# Patient Record
Sex: MALE | Race: WHITE | NOT HISPANIC OR LATINO | Employment: FULL TIME | URBAN - NONMETROPOLITAN AREA
[De-identification: names, ages, dates, MRNs, and addresses within clinical notes are randomized per-mention and may not be internally consistent; named-entity substitution may affect disease eponyms.]

---

## 2019-12-05 ENCOUNTER — OFFICE VISIT (OUTPATIENT)
Dept: MEDICAL GROUP | Facility: PHYSICIAN GROUP | Age: 44
End: 2019-12-05
Payer: COMMERCIAL

## 2019-12-05 VITALS
OXYGEN SATURATION: 95 % | RESPIRATION RATE: 20 BRPM | DIASTOLIC BLOOD PRESSURE: 86 MMHG | BODY MASS INDEX: 31.34 KG/M2 | HEART RATE: 90 BPM | TEMPERATURE: 98.1 F | HEIGHT: 74 IN | WEIGHT: 244.2 LBS | SYSTOLIC BLOOD PRESSURE: 132 MMHG

## 2019-12-05 DIAGNOSIS — Z23 IMMUNIZATION DUE: ICD-10-CM

## 2019-12-05 DIAGNOSIS — I10 ESSENTIAL HYPERTENSION: ICD-10-CM

## 2019-12-05 DIAGNOSIS — F90.9 ATTENTION DEFICIT HYPERACTIVITY DISORDER (ADHD), UNSPECIFIED ADHD TYPE: ICD-10-CM

## 2019-12-05 DIAGNOSIS — H93.19 TINNITUS, UNSPECIFIED LATERALITY: ICD-10-CM

## 2019-12-05 DIAGNOSIS — F32.9 MAJOR DEPRESSIVE DISORDER, REMISSION STATUS UNSPECIFIED, UNSPECIFIED WHETHER RECURRENT: ICD-10-CM

## 2019-12-05 PROBLEM — K21.9 GASTROESOPHAGEAL REFLUX DISEASE WITHOUT ESOPHAGITIS: Status: ACTIVE | Noted: 2019-12-05

## 2019-12-05 PROCEDURE — 99204 OFFICE O/P NEW MOD 45 MIN: CPT | Mod: 25 | Performed by: NURSE PRACTITIONER

## 2019-12-05 PROCEDURE — 90471 IMMUNIZATION ADMIN: CPT | Performed by: NURSE PRACTITIONER

## 2019-12-05 PROCEDURE — 90715 TDAP VACCINE 7 YRS/> IM: CPT | Performed by: NURSE PRACTITIONER

## 2019-12-05 RX ORDER — METHYLPHENIDATE HYDROCHLORIDE 36 MG/1
1 TABLET, EXTENDED RELEASE ORAL DAILY
COMMUNITY
End: 2022-07-25 | Stop reason: SDUPTHER

## 2019-12-05 RX ORDER — BUPROPION HYDROCHLORIDE 150 MG/1
150 TABLET, EXTENDED RELEASE ORAL DAILY
Qty: 90 TAB | Refills: 1 | Status: SHIPPED | OUTPATIENT
Start: 2019-12-05 | End: 2020-05-27

## 2019-12-05 RX ORDER — BUPROPION HYDROCHLORIDE 150 MG/1
150 TABLET, EXTENDED RELEASE ORAL DAILY
COMMUNITY
End: 2019-12-05 | Stop reason: SDUPTHER

## 2019-12-05 RX ORDER — FLUOXETINE HYDROCHLORIDE 40 MG/1
40 CAPSULE ORAL DAILY
COMMUNITY
End: 2022-08-22 | Stop reason: SDUPTHER

## 2019-12-05 RX ORDER — TOPIRAMATE 25 MG/1
25 TABLET ORAL DAILY
Qty: 90 TAB | Refills: 1 | Status: SHIPPED | OUTPATIENT
Start: 2019-12-05 | End: 2022-07-25

## 2019-12-05 RX ORDER — TOPIRAMATE 25 MG/1
25 TABLET ORAL DAILY
COMMUNITY
End: 2019-12-05 | Stop reason: SDUPTHER

## 2019-12-05 RX ORDER — LISINOPRIL 20 MG/1
20 TABLET ORAL DAILY
COMMUNITY
End: 2020-01-06

## 2019-12-05 RX ORDER — ZOLPIDEM TARTRATE 3.5 MG/1
1 TABLET SUBLINGUAL
COMMUNITY
End: 2022-07-25 | Stop reason: SDUPTHER

## 2019-12-05 RX ORDER — LISINOPRIL 40 MG/1
40 TABLET ORAL DAILY
Qty: 30 TAB | Refills: 0 | Status: SHIPPED | OUTPATIENT
Start: 2019-12-05 | End: 2019-12-30

## 2019-12-05 SDOH — HEALTH STABILITY: MENTAL HEALTH: HOW OFTEN DO YOU HAVE A DRINK CONTAINING ALCOHOL?: 2-3 TIMES A WEEK

## 2019-12-05 SDOH — HEALTH STABILITY: MENTAL HEALTH: HOW MANY STANDARD DRINKS CONTAINING ALCOHOL DO YOU HAVE ON A TYPICAL DAY?: 1 OR 2

## 2019-12-05 ASSESSMENT — ENCOUNTER SYMPTOMS
DIZZINESS: 0
BLURRED VISION: 0
SHORTNESS OF BREATH: 0
BLOOD IN STOOL: 0
CHILLS: 0
PALPITATIONS: 0
FEVER: 0
DEPRESSION: 0
ABDOMINAL PAIN: 0
HEADACHES: 0

## 2019-12-05 ASSESSMENT — PATIENT HEALTH QUESTIONNAIRE - PHQ9: CLINICAL INTERPRETATION OF PHQ2 SCORE: 0

## 2019-12-05 NOTE — PROGRESS NOTES
New Patient appointment    Willie Smith is a 44 y.o. male here to establish care. His previous PCP was located [place]. He presents with the following concerns:    HPI:      Essential hypertension  Chronic in nature. His BP has been managed with lisinopril 20mg daily for the past 6 months. He has been monitoring BP at home which has been avg 140-160s/90s at home. He denies CP or SOB. He has been experiencing intermittent headaches.      Major depressive disorder  This is a chronic issue. His symptoms have been managed with Prozac 40mg and Wellbutrin XL 150mg daily. He also takes Ambien prn for insomnia. He denies suicidal or homicidal ideation.    ADHD  This is a chronic issue. His symptoms have been managed with Ritalin 36mg daily. He reports difficulties with concentration and completing work tasks without medication.    Tinnitus  This is a chronic issue. His symptoms are managed with Topamax 25 mg daily.      Current medicines (including changes today)  Current Outpatient Medications   Medication Sig Dispense Refill   • lisinopril (PRINIVIL) 20 MG Tab Take 20 mg by mouth every day.     • Zolpidem Tartrate 3.5 MG SL Tab Place 1 Tab under tongue every bedtime.     • Methylphenidate HCl ER 36 MG TABLET SR 24 HR Take 1 Tab by mouth every day.     • fluoxetine (PROZAC) 40 MG capsule Take 40 mg by mouth every day.     • lisinopril (PRINIVIL) 40 MG tablet Take 1 Tab by mouth every day. 30 Tab 0   • buPROPion SR (WELLBUTRIN-SR) 150 MG TABLET SR 12 HR sustained-release tablet Take 1 Tab by mouth every day. 90 Tab 1   • topiramate (TOPAMAX) 25 MG Tab Take 1 Tab by mouth every day. 90 Tab 1   • pantoprazole (PROTONIX) 40 MG Tablet Delayed Response Take 40 mg by mouth every day.     • ibuprofen (MOTRIN) 600 MG Tab Take 600 mg by mouth every 6 hours as needed.     • Azelastine HCl (ASTEPRO) 0.15 % Solution Spray  in nose.       No current facility-administered medications for this visit.      He  has a past medical  history of ADD (attention deficit disorder), Depression, GERD (gastroesophageal reflux disease), Hypertension, and Tinnitus.  He  has a past surgical history that includes pr sinus surgery proc unlisted (age 11); zzz excision lesion benign; knee arthroscopy (2014); and knee arthroscopy (Left, 8/10/2015).  Social History     Tobacco Use   • Smoking status: Former Smoker     Packs/day: 0.00     Years: 3.00     Pack years: 0.00     Last attempt to quit: 2005     Years since quittin.3   • Smokeless tobacco: Former User     Types: Chew   • Tobacco comment: 1-2   Substance Use Topics   • Alcohol use: Yes     Frequency: 2-3 times a week     Drinks per session: 1 or 2     Comment: 1-2 drinks a day   • Drug use: No     Social History     Patient does not qualify to have social determinant information on file (likely too young).   Social History Narrative   • Not on file     Family History   Problem Relation Age of Onset   • Cancer Mother    • No Known Problems Sister    • Depression Daughter      No family status information on file.     Review of Systems   Constitutional: Negative for chills and fever.   HENT: Positive for tinnitus. Negative for hearing loss.    Eyes: Negative for blurred vision.   Respiratory: Negative for shortness of breath.    Cardiovascular: Negative for chest pain and palpitations.   Gastrointestinal: Negative for abdominal pain and blood in stool.   Neurological: Negative for dizziness and headaches.   Psychiatric/Behavioral: Negative for depression and suicidal ideas.       All other systems reviewed and are negative    Depression Screening    Little interest or pleasure in doing things?  0 - not at all  Feeling down, depressed , or hopeless? 0 - not at all  Patient Health Questionnaire Score: 0    If depressive symptoms identified deferred to follow up visit unless specifically addressed in assesment and plan.      Interpretation of PHQ-9 Total Score   Score Severity   1-4 Minimal  "Depression   5-9 Mild Depression   10-14 Moderate Depression   15-19 Moderately Severe Depression   20-27 Severe Depression         Objective:     /86   Pulse 90   Temp 36.7 °C (98.1 °F) (Temporal)   Resp 20   Ht 1.88 m (6' 2\")   Wt 110.8 kg (244 lb 3.2 oz)   SpO2 95%  Body mass index is 31.35 kg/m².    Physical Exam:  Constitutional: Oriented to person, place, and time and well-developed, well-nourished, and in no distress.   HENT:   Head: Normocephalic and atraumatic.   Right Ear: Tympanic membrane and external ear normal.   Left Ear: Tympanic membrane and external ear normal.   Mouth/Throat: Oropharynx is clear and moist and mucous membranes are normal. No oropharyngeal exudate or posterior oropharyngeal erythema.   Eyes: Conjunctivae and EOM are normal. Pupils are equal, round, and reactive to light.   Neck: Normal range of motion. Neck supple. No thyromegaly present.   Cardiovascular: Normal rate, regular rhythm, normal heart sounds. Radial pulses intact. Exam reveals no friction rub. No murmur heard.  Pulmonary/Chest: Effort normal and breath sounds normal. No respiratory distress or use of accessory muscles. No wheezes, rhonchi, or rales.   Abdominal: Soft. Bowel sounds are normal. Exhibits no distension and no mass. There is no tenderness. No hepatosplenomegaly.    Musculoskeletal: Full range of motion. No deformity or swelling of joints. DTRs intact.   Neurological: Alert and oriented to person, place, and time. Gait normal.   Skin: Skin is warm and dry. No cyanosis. No edema.  Psychiatric: Mood, memory, affect and judgment normal.     Assessment and Plan:   The following treatment plan was discussed    1. Essential hypertension  Uncontrolled. Increase lisinopril 40 mg daily. Continue to monitor BP at home and keep log. Continue to work on lifestyle modifications with diet and physical activity.  - lisinopril (PRINIVIL) 40 MG tablet; Take 1 Tab by mouth every day.  Dispense: 30 Tab; Refill: " 0    2. Attention deficit hyperactivity disorder (ADHD), unspecified ADHD type  He was referred to psychiatry for further management of his symptoms.  - REFERRAL TO PSYCHIATRY  - REFERRAL TO PSYCHOLOGY    3. Major depressive disorder, remission status unspecified, unspecified whether recurrent  He was referred to psychiatry and counseling for further management of his symptoms. Continue medications as prescribed.  - REFERRAL TO PSYCHIATRY  - REFERRAL TO PSYCHOLOGY  - buPROPion SR (WELLBUTRIN-SR) 150 MG TABLET SR 12 HR sustained-release tablet; Take 1 Tab by mouth every day.  Dispense: 90 Tab; Refill: 1    4. Tinnitus, unspecified laterality  Stable. Refill medication.  - topiramate (TOPAMAX) 25 MG Tab; Take 1 Tab by mouth every day.  Dispense: 90 Tab; Refill: 1    5. Immunization due  I have placed the below orders and discussed them with an approved delegating provider.  The MA is performing the below orders under the direction of Dr. Greco.  - TDAP VACCINE =>8YO IM      Records requested.    Followup: Return in about 6 months (around 6/5/2020), or if symptoms worsen or fail to improve.

## 2019-12-05 NOTE — ASSESSMENT & PLAN NOTE
Chronic in nature. His BP has been managed with lisinopril 20mg daily for the past 6 months. He has been monitoring BP at home which has been avg 140-160s/90s at home. He denies CP or SOB. He has been experiencing intermittent headaches.

## 2019-12-05 NOTE — ASSESSMENT & PLAN NOTE
This is a chronic issue. His symptoms have been managed with Prozac 40mg and Wellbutrin XL 150mg daily. He also takes Ambien prn for insomnia. He denies suicidal or homicidal ideation.

## 2019-12-05 NOTE — LETTER
[facility]  KULWINDER Manuel  [address]  [facility] [address]  Fax: [phone]   Authorization for Release/Disclosure of   Protected Health Information   Name: RAHUL PINEDA : 1975 SSN: [id number]   Address: [facility] [address]  [facility] [address] Phone:    [phone] (home)    I authorize the entity listed below to release/disclose the PHI below to:   [facility]/KULWINDER Manuel and KULWINDER Manuel   Provider or Entity Name:  [facility] ([place], [place])   Address   City, State, Zip   Phone:      Fax:     Reason for request: continuity of care   Information to be released:    [  ] LAST COLONOSCOPY,  including any PATH REPORT and follow-up  [  ] LAST FIT/COLOGUARD RESULT [  ] LAST DEXA  [  ] LAST MAMMOGRAM  [  ] LAST PAP  [  ] LAST LABS [  ] RETINA EXAM REPORT  [  ] IMMUNIZATION RECORDS  [ X ] Release all info      [  ] Check here and initial the line next to each item to release ALL health information INCLUDING  _____ Care and treatment for drug and / or alcohol abuse  _____ HIV testing, infection status, or AIDS  _____ Genetic Testing    DATES OF SERVICE OR TIME PERIOD TO BE DISCLOSED: _____________  I understand and acknowledge that:  * This Authorization may be revoked at any time by you in writing, except if your health information has already been used or disclosed.  * Your health information that will be used or disclosed as a result of you signing this authorization could be re-disclosed by the recipient. If this occurs, your re-disclosed health information may no longer be protected by State or Federal laws.  * You may refuse to sign this Authorization. Your refusal will not affect your ability to obtain treatment.  * This Authorization becomes effective upon signing and will  on (date) __________.      If no date is indicated, this Authorization will  one (1) year from the signature date.    Name: Rahul Salinas  Sarah    Signature:   Date:     12/5/2019       PLEASE FAX REQUESTED RECORDS BACK TO: [phone]

## 2019-12-05 NOTE — ASSESSMENT & PLAN NOTE
This is a chronic issue. His symptoms have been managed with Ritalin 36mg daily. He reports difficulties with concentration and completing work tasks without medication.

## 2020-01-06 ENCOUNTER — OFFICE VISIT (OUTPATIENT)
Dept: MEDICAL GROUP | Facility: PHYSICIAN GROUP | Age: 45
End: 2020-01-06
Payer: COMMERCIAL

## 2020-01-06 VITALS
BODY MASS INDEX: 30.8 KG/M2 | RESPIRATION RATE: 14 BRPM | OXYGEN SATURATION: 96 % | HEIGHT: 74 IN | SYSTOLIC BLOOD PRESSURE: 126 MMHG | DIASTOLIC BLOOD PRESSURE: 74 MMHG | HEART RATE: 86 BPM | TEMPERATURE: 96.8 F | WEIGHT: 240 LBS

## 2020-01-06 DIAGNOSIS — R05.9 COUGH: ICD-10-CM

## 2020-01-06 PROCEDURE — 99214 OFFICE O/P EST MOD 30 MIN: CPT | Performed by: INTERNAL MEDICINE

## 2020-01-06 RX ORDER — AZITHROMYCIN 250 MG/1
TABLET, FILM COATED ORAL
Qty: 6 TAB | Refills: 0 | Status: SHIPPED | OUTPATIENT
Start: 2020-01-06 | End: 2020-03-05

## 2020-01-06 ASSESSMENT — PATIENT HEALTH QUESTIONNAIRE - PHQ9
5. POOR APPETITE OR OVEREATING: NOT AT ALL
SUM OF ALL RESPONSES TO PHQ9 QUESTIONS 1 AND 2: 0
4. FEELING TIRED OR HAVING LITTLE ENERGY: NOT AT ALL
SUM OF ALL RESPONSES TO PHQ QUESTIONS 1-9: 0
6. FEELING BAD ABOUT YOURSELF - OR THAT YOU ARE A FAILURE OR HAVE LET YOURSELF OR YOUR FAMILY DOWN: NOT AL ALL
1. LITTLE INTEREST OR PLEASURE IN DOING THINGS: NOT AT ALL
8. MOVING OR SPEAKING SO SLOWLY THAT OTHER PEOPLE COULD HAVE NOTICED. OR THE OPPOSITE, BEING SO FIGETY OR RESTLESS THAT YOU HAVE BEEN MOVING AROUND A LOT MORE THAN USUAL: NOT AT ALL
2. FEELING DOWN, DEPRESSED, IRRITABLE, OR HOPELESS: NOT AT ALL
9. THOUGHTS THAT YOU WOULD BE BETTER OFF DEAD, OR OF HURTING YOURSELF: NOT AT ALL
3. TROUBLE FALLING OR STAYING ASLEEP OR SLEEPING TOO MUCH: NOT AT ALL
7. TROUBLE CONCENTRATING ON THINGS, SUCH AS READING THE NEWSPAPER OR WATCHING TELEVISION: NOT AT ALL

## 2020-01-06 NOTE — PROGRESS NOTES
Established Patient    Willie Smith is a 44 y.o. male who presents today with the following:    CC:   Chief Complaint   Patient presents with   • Cough     x 3 weeks       HPI:     Sinus congestion, post nasal drip, cough for 2-3 weeks    Initially sinus congestion improved, yet it's getting worse again. Tried OTC prabhu seltzer.    Denies fever, chills, chest pain, SOB, wheezing, nausea, vomiting, abdominal pain, diarrhea, dysuria  Wife also sick with GI symptoms such as nausea and diarrhea  Hx of GERD, well controlled with PPI    Current Outpatient Medications   Medication Sig Dispense Refill   • azithromycin (ZITHROMAX) 250 MG Tab Take 2 tabs on day 1, then take 1 tab on days 2-5 6 Tab 0   • lisinopril (PRINIVIL) 40 MG tablet TAKE 1 TABLET BY MOUTH EVERY DAY 90 Tab 0   • Zolpidem Tartrate 3.5 MG SL Tab Place 1 Tab under tongue every bedtime.     • Methylphenidate HCl ER 36 MG TABLET SR 24 HR Take 1 Tab by mouth every day.     • fluoxetine (PROZAC) 40 MG capsule Take 40 mg by mouth every day.     • buPROPion SR (WELLBUTRIN-SR) 150 MG TABLET SR 12 HR sustained-release tablet Take 1 Tab by mouth every day. 90 Tab 1   • topiramate (TOPAMAX) 25 MG Tab Take 1 Tab by mouth every day. 90 Tab 1   • pantoprazole (PROTONIX) 40 MG Tablet Delayed Response Take 40 mg by mouth every day.     • ibuprofen (MOTRIN) 600 MG Tab Take 600 mg by mouth every 6 hours as needed.     • Azelastine HCl (ASTEPRO) 0.15 % Solution Spray  in nose.       No current facility-administered medications for this visit.        Allergies, past medical history, past surgical history, medications, family history, social history reviewed and updated.    ROS   Constitutional: Denies fevers or chills  Eyes: Denies changes in vision  Ears/Nose/Throat/Mouth: per HPI   Cardiovascular: Denies chest pain or palpitations   Respiratory: per HPI  Gastrointestinal/Hepatic: Denies abd pain, nausea, vomiting   Genitourinary: Denies dysuria or  "frequency  Musculoskeletal/Rheum: Denies joint pain and swelling   Neurological: Denies headache  Psychiatric: Denies mood disorder   Endocrine: Denies hx of diabetes or thyroid dysfunction  Heme/Oncology/Lymph Nodes: Denies weight changes or enlarged LNs.    Physical Exam  Vitals: /74 (BP Location: Left arm, Patient Position: Sitting, BP Cuff Size: Large adult)   Pulse 86   Temp 36 °C (96.8 °F) (Temporal)   Resp 14   Ht 1.88 m (6' 2\")   Wt 108.9 kg (240 lb)   SpO2 96%   BMI 30.81 kg/m²   General: Alert, pleasant, NAD  HEENT: Normocephalic.  EOMI, no icterus or pallor.  Conjunctivae and lids normal. External ears normal. Oropharynx non-erythematous, mucous membranes moist.  Neck supple.  No thyromegaly or masses palpated.   Tympanic membranes intact.  Nares patent.  no frontal, maxillary sinus tenderness. No pharyngeal exudate.  No  mastoid swelling & tenderness.    Lymph: No cervical or supraclavicular lymphadenopathy.  Cardiovascular: Regular rate and rhythm.    Respiratory: Normal respiratory effort.  Clear to auscultation bilaterally.  Abdomen: Non-distended, soft  Skin: Warm, dry, no rashes.  Musculoskeletal: Gait is normal.  Moves all extremities well.  Extremities: No leg edema.     Psych:  Affect/mood is normal, judgement is good, memory is intact, grooming is appropriate.      Assessment and Plan    1. Cough  - Empiric azithromycin (ZITHROMAX) 250 MG Tab; Take 2 tabs on day 1, then take 1 tab on days 2-5  Dispense: 6 Tab; Refill: 0  --Increase fluid intake, rest.  --Nasal irrigation or a Neti-pot.  --Humidifier in room or hot shower/bath.  --warm salt water gargles  --Tylenol as needed for aches and pain, fever.  --Guaifenesin as an expectorant (Mucinex, Robitussin, etc).  --Dextromethorphan for cough (Robitussin).  --Intranasal steroids (Flonase, prescription or over-the-counter).  --Prevention: Wash hands, cover cough, avoid immunocompromised patients, and stay at home.      Follow-up:Return " if symptoms worsen or fail to improve.    This note was created using voice recognition software. There may be unintended errors in spelling, grammar or content.

## 2020-01-13 ENCOUNTER — OFFICE VISIT (OUTPATIENT)
Dept: BEHAVIORAL HEALTH | Facility: CLINIC | Age: 45
End: 2020-01-13
Payer: COMMERCIAL

## 2020-01-13 VITALS
SYSTOLIC BLOOD PRESSURE: 140 MMHG | BODY MASS INDEX: 30.93 KG/M2 | HEART RATE: 86 BPM | DIASTOLIC BLOOD PRESSURE: 70 MMHG | WEIGHT: 241 LBS | OXYGEN SATURATION: 95 % | HEIGHT: 74 IN

## 2020-01-13 DIAGNOSIS — G47.00 INSOMNIA, UNSPECIFIED TYPE: ICD-10-CM

## 2020-01-13 DIAGNOSIS — F33.0 MILD EPISODE OF RECURRENT MAJOR DEPRESSIVE DISORDER (HCC): ICD-10-CM

## 2020-01-13 DIAGNOSIS — F41.9 ANXIETY DISORDER, UNSPECIFIED TYPE: ICD-10-CM

## 2020-01-13 DIAGNOSIS — F90.9 ATTENTION DEFICIT HYPERACTIVITY DISORDER (ADHD), UNSPECIFIED ADHD TYPE: ICD-10-CM

## 2020-01-13 PROCEDURE — 99204 OFFICE O/P NEW MOD 45 MIN: CPT | Performed by: NURSE PRACTITIONER

## 2020-01-13 RX ORDER — METHYLPHENIDATE HYDROCHLORIDE 36 MG/1
36 TABLET ORAL EVERY MORNING
Qty: 30 TAB | Refills: 0 | Status: SHIPPED | OUTPATIENT
Start: 2020-01-13 | End: 2020-02-12

## 2020-01-13 RX ORDER — METHYLPHENIDATE HYDROCHLORIDE 36 MG/1
36 TABLET ORAL EVERY MORNING
Qty: 30 TAB | Refills: 0 | Status: SHIPPED | OUTPATIENT
Start: 2020-03-11 | End: 2020-04-10

## 2020-01-13 RX ORDER — METHYLPHENIDATE HYDROCHLORIDE 36 MG/1
36 TABLET ORAL EVERY MORNING
Qty: 30 TAB | Refills: 0 | Status: SHIPPED | OUTPATIENT
Start: 2020-02-11 | End: 2020-03-05

## 2020-01-13 ASSESSMENT — PATIENT HEALTH QUESTIONNAIRE - PHQ9
SUM OF ALL RESPONSES TO PHQ QUESTIONS 1-9: 8
5. POOR APPETITE OR OVEREATING: 1 - SEVERAL DAYS
CLINICAL INTERPRETATION OF PHQ2 SCORE: 1

## 2020-01-13 NOTE — PROGRESS NOTES
"INITIAL PSYCHIATRY EVALUATION    Chief Complaint:     \"I need a psych.\"    History Of Present Illness:  Willie Smith is a 44 y.o. male with history of depression, insomnia, ADHD referred by Emily Henao NP.      The patient notes that he has just moved back here from [place] and needs to get established with new psychiatric care in this area.  He notes that he was in a 5-year relationship in [place], which ended around Thanksgiving time.  At that time, he has moved back to [place].  He notes that he although he has been treated for ADHD on and off most of his life, he most recently started being treated for depression approximately 8 years ago and was placed on Prozac. He has been on this medication ever since.  He feels as though this medication has been getting keeping him out of depression.  Today, he denies overt depressive symptoms, denies anhedonia.  He notes he has been sleeping well, getting approximately 6 hours a night most nights.  Occasionally, approximately 2-3 times a week, he has to take Ambien 3.5 mg, which does help him sleep without giving him a hangover as other sleeping medications have in the past.  His appetite is intact.  His energy level is okay, ongoing and occasionally feels tired.  He denies suicidal ideations, passive or active at this time.     He notes there are some things that he over-thinks, and some things he should care about that he does not think about at all.  He does not describe himself as a worrier.  However, he does feel irritable, restless, and tense often.  He denies having panic attacks.  He denies a symptomatology insists he ate it with OCD or PTSD.  The patient denies a history of an elevated/irritable mood, reckless impulsivity, or pressured speech.  Denies auditory and visual hallucinations, denies paranoia.  Denies homicidal ideations.  He does have a history of methamphetamine abuse several years ago, but has been clean for several years.  He " does drink approximately 2 times a week, 2 drinks at a time.    The patient does have a lifelong history of being treated for ADHD.  He notes that he often has trouble starting tasks and staying on task when he does start them.  He has trouble seeing things through to completion.  He feels impatient, restless, and impulsive at times.  He has most recently been treated with Concerta 36 mg p.o. daily.  He feels this medication is a good amount for helping his ADHD symptoms stabilize.    He denies side effects from his current medication regimen and wishes to continue them at this time.  He is most interested in getting established with therapy at this clinic to help him talk through his recent break-up and stressors.    Current psychiatric medications   Prozac 40 mg po q day - on for 8 years  Wellbutrin  mg q day - on for 2 years  Ambien 3.5 mg q HS PRN - on for a few years  Concerta 36 mg po q am - on and off for years    Topamax     Previous psychotropic medication trials:   Ritalin throughout childhood  An antidepressant in the past    Past Psychiatric History:   Prior diagnosis: ADHD, depression, insomnia  Past prescribing provider(s): PCPs in the past  Prior psychiatric hospitalization: denies  Hx of self-harm/suicide attempt: denies SA. Self-mutilation Nov 26 punched self  Hx of violence: punches things when mad  Hx of psychotherapy: Edita De La Garza x 6 visits.  History of emotional/physical/sexual abuse: denies    Allergies:  Latex and Food    Family History:   Family History   Problem Relation Age of Onset   • Cancer Mother    • Anxiety disorder Mother    • No Known Problems Sister    • Depression Daughter        Past Medical/Surgical History:  Past Medical History:   Diagnosis Date   • ADD (attention deficit disorder)    • Depression    • GERD (gastroesophageal reflux disease)    • Hypertension    • Tinnitus      Past Surgical History:   Procedure Laterality Date   • KNEE ARTHROSCOPY Left 8/10/2015     Procedure: KNEE ARTHROSCOPIC Meniscal Excision, Abrasion Arthroplasty, Synovectomy;  Surgeon: Kendall Bob III, M.D.;  Location: SURGERY [facility];  Service:    • KNEE ARTHROSCOPY  1/6/2014     left knee performed by Dr Bob   • [place] SINUS SURGERY PROC UNLISTED  age 11   • ZZZ EXCISION LESION BENIGN      from neck     Medications:  Current Outpatient Medications   Medication Sig Dispense Refill   • azithromycin (ZITHROMAX) 250 MG Tab Take 2 tabs on day 1, then take 1 tab on days 2-5 6 Tab 0   • lisinopril (PRINIVIL) 40 MG tablet TAKE 1 TABLET BY MOUTH EVERY DAY 90 Tab 0   • Zolpidem Tartrate 3.5 MG SL Tab Place 1 Tab under tongue every bedtime.     • Methylphenidate HCl ER 36 MG TABLET SR 24 HR Take 1 Tab by mouth every day.     • fluoxetine (PROZAC) 40 MG capsule Take 40 mg by mouth every day.     • buPROPion SR (WELLBUTRIN-SR) 150 MG TABLET SR 12 HR sustained-release tablet Take 1 Tab by mouth every day. 90 Tab 1   • topiramate (TOPAMAX) 25 MG Tab Take 1 Tab by mouth every day. 90 Tab 1   • pantoprazole (PROTONIX) 40 MG Tablet Delayed Response Take 40 mg by mouth every day.     • ibuprofen (MOTRIN) 600 MG Tab Take 600 mg by mouth every 6 hours as needed.     • Azelastine HCl (ASTEPRO) 0.15 % Solution Spray  in nose.       No current facility-administered medications for this visit.        Substance Use/Addiction History:  Amphetamine:  Amphetamine frequency: Past occasional use      Cannibis:  Cannabis frequency: Past rare use      Cocaine:  Cocaine frequency: Never used      Ecstasy:  Ecstasy frequency: Never used      Hallucinogen:  Hallucinogen frequency: Past rare use      Inhalant:   Inhalant frequency: Never used      Opiate:  Opiate frequency: Never used  Cannabis frequency: Past rare use      Other:  Other drug frequency: Never used      Sedative:   Sedative frequency: Never used    Nicotine:  denies    Alcohol:  2 times a weeks 2 drinks    History of inpatient/outpatient withdrawal or rehab treatment:  "  denies    Caffeine:   1-3 drinks/day    Social History:  Childhood: from this area    Education: HS degree    Employment: [employer] for 16 years    Relationship/Children:  once, .  24 yo daughter. Single currently.    Current living situation: moved in to his friend's in-law quarters recently    Hobbies: getting out of the house    Support system: Brayan, best friend    History or current legal issues: arrested x1 for failure to obey orders    Access to firearms:  yes    Depression Screening (PHQ-9 Score) Today: 8    Interpretation of PHQ-9 Total Score   Score Severity   1-4 No Depression   5-9 Mild Depression   10-14 Moderate Depression   15-19 Moderately Severe Depression   20-27 Severe Depression    Physical Examination:  /70 (BP Location: Right arm, Patient Position: Sitting, BP Cuff Size: Adult)   Pulse 86   Ht 1.88 m (6' 2\")   Wt 109.3 kg (241 lb)   SpO2 95%   BMI 30.94 kg/m²     Musculoskeletal: age-appropriate gait and station, good balance and no abnormal movements noted    Review of Symptoms:  Constitutional: denies recent chills, fevers  Neuro: denies recent weakness, numbness, or headaches  HEENT: hx of tinnitus  Cardiovascular: history of hypertension  Respiratory:  denies recent shortness of breath, dyspnea, or cough  GI: history of GERD  : denies recent urinary urgency frequency or urgency  Skin: denies recent rash or skin lesions  Endocrine: denies recent cold and heat intolerance, denies excessive thirst  Hematologic: denies recent abnormal bleeding and bruising easily  Psychiatric: See HPI    Mental Status Examination:   Appearance:  male, dressed in casual attire, male-pattern baldness  Behavior: cooperative, good eye contact, no psychomotor agitation or retardation noted  Participation: active verbal participation, engaged  Speech: spontaneous, regular rate, rhythm, and volume noted.  Language appropriate.  Mood: \"sleep.\"  Affect: anxious and mood " "congruent  Orientation: alert and oriented to person, place, situation, and time.  Attention/concentration: Intact. Able to spell the word “world” forwards and backwards without difficulty.   Associations/Abstract reasoning: Adequate. Identifies similarities between a car and a submarine as \"transport\". Interprets meaning of the proverb “Don’t  a book by its cover” by \"don't draw conclusions w/o investigating.\"  Thought Process: circumstantial  Thought Content: denies passive/active suicidal ideations, intent, or plan, denies homicidal ideations  Perception: denies auditory or visual hallucinations, no delusions noted  Fund of knowledge and vocabulary: Adequate.  Memory: No gross evidence of memory deficits, able to recall 3 words (apple, elephant, baseball) after 3 minutes without difficulty  Insight: Fair.  Judgment: Fair.    Medical Records/Labs/Medications/Diagnostic Tests Reviewed:   records reviewed, recent relevant provider encounters reviewed, recent relevant labs in record reviewed, all medications patient is taking reviewed.         Getting filled most in [place]    Results for RAHUL PINEDA (MRN [id number]) as of 1/13/2020 14:27   Ref. Range 12/4/2015 14:35   Sodium Latest Ref Range: 135 - 145 mmol/L 136   Potassium Latest Ref Range: 3.6 - 5.5 mmol/L 3.4 (L)   Chloride Latest Ref Range: 96 - 112 mmol/L 105       Strengths/Assets:  Patient strengths identified included self-awareness, family support, social support, optimism, evidence of effective treatment, hopeful for future    If the patient could have any three wishes, they would wish for:  1. money  2. wisdom  3. Brand new car/house    Impression:  MDD, recurrent, mild  ADHD, unspecified  Anxiety disorder, unspecified  Insomnia, unspecified    Plan:  1. Medication options, alternatives (including no medications) and medication risks/benefits/side effects were discussed in detail.   2. Continue Prozac 40 mg po q day for depressive and " anxiety symptoms.  3. Continue Wellbutrin  mg q day for depressive symptoms. Do not drink on this medication.   4. A full mental health evaluation and patient-risk assessment for controlled substance use were completed.  A lengthy discussion on non-controlled treatment alternatives was had with the patient. After weighing the patients' benefits versus disadvantages of a controlled substance prescription, a controlled substance was ordered.  A controlled medication agreement plan was went over in detail and agreed to by patient.  5. Continue Ambien 3.5 mg q HS PRN insomnia. No refills given.  6. Continue Concerta 36 mg po q am for ADHD symptoms.   7. Refilled Concerta, has refills of all other medications.  8.   Will refer to therapist at this clinic.  Has an appointment next month.  9.   The patient was counseled on the benefits of engaging in 30 minutes of aerobic physical exercise 3-5 times a week to the patient's ability to help with psychiatric symptoms, verbalized understanding. and Education on eating a balanced, healthy diet based on the food pyramid was provided, verbalized understanding.  10.   Educated on caffeine's correlation with anxiety.  Discussed the potential benefits of decreasing caffeine on current psychiatric symptoms, verbalized understanding.  11. The patient was advised to call, message provider on [email or web address] ([employer]), or come in to the clinic if symptoms worsen or if any future questions/issues regarding their medications arise; the patient verbalized understanding and agreement.    12. The patient was educated to call 911, call the suicide hotline, or go to local ER if having thoughts of suicide or homicide; verbalized understanding.    Return to clinic in 3 months or sooner if symptoms worsen.    The proposed treatment plan was discussed with the patient who was provided the opportunity to ask questions and make suggestions regarding alternative treatment. Patient verbalized understanding and  expressed agreement with the plan.     Thank you for allowing me to participate in the care of this patient.    SILAS Moreno.      This note was created using voice recognition software (Dragon). The accuracy of the dictation is limited by the abilities of the software. I have reviewed the note prior to signing, however some errors in grammar and context are still possible. If you have any questions related to this note please do not hesitate to contact our office.

## 2020-01-15 ENCOUNTER — OFFICE VISIT (OUTPATIENT)
Dept: URGENT CARE | Facility: PHYSICIAN GROUP | Age: 45
End: 2020-01-15
Payer: COMMERCIAL

## 2020-01-15 VITALS
RESPIRATION RATE: 20 BRPM | BODY MASS INDEX: 30.8 KG/M2 | HEART RATE: 77 BPM | DIASTOLIC BLOOD PRESSURE: 80 MMHG | HEIGHT: 74 IN | SYSTOLIC BLOOD PRESSURE: 110 MMHG | OXYGEN SATURATION: 97 % | TEMPERATURE: 97.7 F | WEIGHT: 240 LBS

## 2020-01-15 DIAGNOSIS — I10 ESSENTIAL HYPERTENSION: ICD-10-CM

## 2020-01-15 DIAGNOSIS — J06.9 URI WITH COUGH AND CONGESTION: ICD-10-CM

## 2020-01-15 DIAGNOSIS — J40 BRONCHITIS: Primary | ICD-10-CM

## 2020-01-15 PROCEDURE — 99214 OFFICE O/P EST MOD 30 MIN: CPT | Performed by: PHYSICIAN ASSISTANT

## 2020-01-15 RX ORDER — PREDNISONE 20 MG/1
TABLET ORAL
Qty: 23 TAB | Refills: 0 | Status: SHIPPED | OUTPATIENT
Start: 2020-01-15 | End: 2020-03-05

## 2020-01-15 RX ORDER — DOXYCYCLINE HYCLATE 100 MG
100 TABLET ORAL 2 TIMES DAILY
Qty: 14 TAB | Refills: 0 | Status: SHIPPED | OUTPATIENT
Start: 2020-01-15 | End: 2020-03-05

## 2020-01-15 RX ORDER — PROMETHAZINE HYDROCHLORIDE AND CODEINE PHOSPHATE 6.25; 1 MG/5ML; MG/5ML
5 SYRUP ORAL 4 TIMES DAILY PRN
Qty: 120 ML | Refills: 0 | Status: SHIPPED | OUTPATIENT
Start: 2020-01-15 | End: 2020-01-22

## 2020-01-16 NOTE — PROGRESS NOTES
Subjective:      Pt is a 44 y.o. male who presents with Cough (Pt satates he has a bad cough x1 month)            HPI  This is a new problem. PT presents to  clinic today complaining of sore throat,  pressure in ears, cough, fatigue, runny nose, wheezing and SOB. PT denies CP, NVD, abdominal pain, joint pain. PT states these symptoms began around 1 month ago. PT states the pain is a 8/10 with coughing fits, aching in nature and worse at night. Pt has not taken any RX medications for this condition. The pt's medication list, problem list, and allergies have been evaluated and reviewed during today's visit.    PMH:  Past Medical History:   Diagnosis Date   • ADD (attention deficit disorder)    • Depression    • GERD (gastroesophageal reflux disease)    • Hypertension    • Tinnitus        PSH:  Past Surgical History:   Procedure Laterality Date   • KNEE ARTHROSCOPY Left 8/10/2015    Procedure: KNEE ARTHROSCOPIC Meniscal Excision, Abrasion Arthroplasty, Synovectomy;  Surgeon: Kendall Bob III, M.D.;  Location: SURGERY [facility];  Service:    • KNEE ARTHROSCOPY  1/6/2014     left knee performed by Dr Bob   • [place] SINUS SURGERY PROC UNLISTED  age 11   • ZZZ EXCISION LESION BENIGN      from neck       Fam Hx:    family history includes Anxiety disorder in his mother; Cancer in his mother; Depression in his daughter; No Known Problems in his sister.  Family Status   Relation Name Status   • Mo  Alive   • Fa  Alive   • Sis  Alive   • Bimal  Alive       Soc HX:  Social History     Socioeconomic History   • Marital status:      Spouse name: Not on file   • Number of children: Not on file   • Years of education: Not on file   • Highest education level: Not on file   Occupational History   • Not on file   Social Needs   • Financial resource strain: Not on file   • Food insecurity:     Worry: Not on file     Inability: Not on file   • Transportation needs:     Medical: Not on file     Non-medical: Not on file    Tobacco Use   • Smoking status: Former Smoker     Packs/day: 0.00     Years: 3.00     Pack years: 0.00     Last attempt to quit: 2005     Years since quittin.4   • Smokeless tobacco: Former User     Types: Chew   • Tobacco comment: 1-2   Substance and Sexual Activity   • Alcohol use: Yes     Frequency: 2-3 times a week     Drinks per session: 1 or 2     Comment: 1-2 drinks a day   • Drug use: No   • Sexual activity: Not on file   Lifestyle   • Physical activity:     Days per week: Not on file     Minutes per session: Not on file   • Stress: Not on file   Relationships   • Social connections:     Talks on phone: Not on file     Gets together: Not on file     Attends Scientologist service: Not on file     Active member of club or organization: Not on file     Attends meetings of clubs or organizations: Not on file     Relationship status: Not on file   • Intimate partner violence:     Fear of current or ex partner: Not on file     Emotionally abused: Not on file     Physically abused: Not on file     Forced sexual activity: Not on file   Other Topics Concern   • Not on file   Social History Narrative   • Not on file         Medications:    Current Outpatient Medications:   •  doxycycline (VIBRAMYCIN) 100 MG Tab, Take 1 Tab by mouth 2 times a day., Disp: 14 Tab, Rfl: 0  •  predniSONE (DELTASONE) 20 MG Tab, Take 3 tabs at once PO daily x 5 days, then take 2 tabs at once daily x 3 days, then take 1 tab PO daily x 2 days, Disp: 23 Tab, Rfl: 0  •  promethazine-codeine (PHENERGAN-CODEINE) 6.25-10 MG/5ML Syrup, Take 5 mL by mouth 4 times a day as needed for Cough for up to 7 days., Disp: 120 mL, Rfl: 0  •  azithromycin (ZITHROMAX) 250 MG Tab, Take 2 tabs on day 1, then take 1 tab on days 2-5, Disp: 6 Tab, Rfl: 0  •  lisinopril (PRINIVIL) 40 MG tablet, TAKE 1 TABLET BY MOUTH EVERY DAY, Disp: 90 Tab, Rfl: 0  •  Zolpidem Tartrate 3.5 MG SL Tab, Place 1 Tab under tongue every bedtime., Disp: , Rfl:   •  Methylphenidate  HCl ER 36 MG TABLET SR 24 HR, Take 1 Tab by mouth every day., Disp: , Rfl:   •  fluoxetine (PROZAC) 40 MG capsule, Take 40 mg by mouth every day., Disp: , Rfl:   •  buPROPion SR (WELLBUTRIN-SR) 150 MG TABLET SR 12 HR sustained-release tablet, Take 1 Tab by mouth every day., Disp: 90 Tab, Rfl: 1  •  topiramate (TOPAMAX) 25 MG Tab, Take 1 Tab by mouth every day., Disp: 90 Tab, Rfl: 1  •  pantoprazole (PROTONIX) 40 MG Tablet Delayed Response, Take 40 mg by mouth every day., Disp: , Rfl:   •  ibuprofen (MOTRIN) 600 MG Tab, Take 600 mg by mouth every 6 hours as needed., Disp: , Rfl:   •  Azelastine HCl (ASTEPRO) 0.15 % Solution, Spray  in nose., Disp: , Rfl:   •  [START ON 3/11/2020] methylphenidate (CONCERTA) 36 MG CR tablet, Take 1 Tab by mouth every morning for 30 days., Disp: 30 Tab, Rfl: 0  •  [START ON 2/11/2020] methylphenidate (CONCERTA) 36 MG CR tablet, Take 1 Tab by mouth every morning for 30 days., Disp: 30 Tab, Rfl: 0  •  methylphenidate (CONCERTA) 36 MG CR tablet, Take 1 Tab by mouth every morning for 30 days., Disp: 30 Tab, Rfl: 0      Allergies:  Latex and Food    ROS    Review of Systems   Constitutional: Positive for malaise/fatigue. Negative for fever and diaphoresis.   HENT: Positive for congestion and sore throat. Negative for ear discharge, hearing loss, nosebleeds and tinnitus.    Eyes: Negative for blurred vision, double vision and photophobia.   Respiratory: Positive for cough, sputum production, shortness of breath and wheezing. Negative for hemoptysis.    Cardiovascular: Negative for chest pain and palpitations.   Gastrointestinal: Negative for nausea, vomiting, abdominal pain, diarrhea and constipation.   Genitourinary: Negative for dysuria and flank pain.   Musculoskeletal: Negative for joint pain and myalgias.   Skin: Negative for itching and rash.   Neurological:  Negative for dizziness, tingling and weakness.   Endo/Heme/Allergies: Does not bruise/bleed easily.   Psychiatric/Behavioral:  "Negative for depression. The patient is not nervous/anxious.           Objective:     /80 (BP Location: Left arm, Patient Position: Sitting, BP Cuff Size: Large adult)   Pulse 77   Temp 36.5 °C (97.7 °F) (Temporal)   Resp 20   Ht 1.88 m (6' 2\")   Wt 108.9 kg (240 lb)   SpO2 97%   BMI 30.81 kg/m²      Physical Exam       Physical Exam   Constitutional: PT is oriented to person, place, and time. PT appears well-developed and well-nourished. No distress.   HENT:   Head: Normocephalic and atraumatic.   Right Ear: Hearing, tympanic membrane, external ear and ear canal normal.   Left Ear: Hearing, tympanic membrane, external ear and ear canal normal.   Nose: Mucosal edema, rhinorrhea and sinus tenderness present. Right sinus exhibits frontal sinus tenderness. Left sinus exhibits frontal sinus tenderness.   Mouth/Throat: Uvula is midline. Mucous membranes are pale. Posterior oropharyngeal edema and posterior oropharyngeal erythema present. No oropharyngeal exudate.   Eyes: Conjunctivae normal and EOM are normal. Pupils are equal, round, and reactive to light. Right eye exhibits no discharge. Left eye exhibits no discharge.   Neck: Normal range of motion. Neck supple. No thyromegaly present.   Cardiovascular: Normal rate, regular rhythm, normal heart sounds and intact distal pulses.  Exam reveals no gallop and no friction rub.    No murmur heard.  Pulmonary/Chest: Effort normal. No respiratory distress. PT has wheezes. PT has no rales. PT exhibits tenderness.   Abdominal: Soft. Bowel sounds are normal. PT exhibits no distension and no mass. There is no tenderness. There is no rebound and no guarding.   Musculoskeletal: Normal range of motion. PT exhibits no edema and no tenderness.   Lymphadenopathy:     PT has no cervical adenopathy.   Neurological: Pt is alert and oriented to person, place, and time. Pt has normal reflexes. No cranial nerve deficit.   Skin: Skin is warm and dry. No rash noted. No erythema. "   Psychiatric: PT has a normal mood and affect. Pt behavior is normal. Judgment and thought content normal.        Assessment/Plan:       1. Bronchitis    - doxycycline (VIBRAMYCIN) 100 MG Tab; Take 1 Tab by mouth 2 times a day.  Dispense: 14 Tab; Refill: 0  - predniSONE (DELTASONE) 20 MG Tab; Take 3 tabs at once PO daily x 5 days, then take 2 tabs at once daily x 3 days, then take 1 tab PO daily x 2 days  Dispense: 23 Tab; Refill: 0    2. URI with cough and congestion    - predniSONE (DELTASONE) 20 MG Tab; Take 3 tabs at once PO daily x 5 days, then take 2 tabs at once daily x 3 days, then take 1 tab PO daily x 2 days  Dispense: 23 Tab; Refill: 0  - promethazine-codeine (PHENERGAN-CODEINE) 6.25-10 MG/5ML Syrup; Take 5 mL by mouth 4 times a day as needed for Cough for up to 7 days.  Dispense: 120 mL; Refill: 0    3. HTN- controlled      Concern for worsening symptoms of URI which shortly could transition to pneumonia and worsening sinus congestion and infection with powerful cough keeping pt up at night as they must sleep upright to avoid coughing fits.  Diff DX: Bronchitis, Sinusitis, Pneumonia, Influenza, Viral URI, Allergies  Rest, fluids encouraged.  OTC decongestant for congestion/cough  Note given for work.  AVS with medical info given.  Pt was in full understanding and agreement with the plan.  Differential diagnosis, natural history, supportive care, and indications for immediate follow-up discussed. All questions answered. Patient agrees with the plan of care.  Follow-up as needed if symptoms worsen or fail to improve to PCP, Urgent care or Emergency Room.

## 2020-01-16 NOTE — PATIENT INSTRUCTIONS
Acute Bronchitis, Adult  Acute bronchitis is when air tubes (bronchi) in the lungs suddenly get swollen. The condition can make it hard to breathe. It can also cause these symptoms:  · A cough.  · Coughing up clear, yellow, or green mucus.  · Wheezing.  · Chest congestion.  · Shortness of breath.  · A fever.  · Body aches.  · Chills.  · A sore throat.  Follow these instructions at home:  Medicines  · Take over-the-counter and prescription medicines only as told by your doctor.  · If you were prescribed an antibiotic medicine, take it as told by your doctor. Do not stop taking the antibiotic even if you start to feel better.  General instructions  · Rest.  · Drink enough fluids to keep your pee (urine) clear or pale yellow.  · Avoid smoking and secondhand smoke. If you smoke and you need help quitting, ask your doctor. Quitting will help your lungs heal faster.  · Use an inhaler, cool mist vaporizer, or humidifier as told by your doctor.  · Keep all follow-up visits as told by your doctor. This is important.  How is this prevented?  To lower your risk of getting this condition again:  · Wash your hands often with soap and water. If you cannot use soap and water, use hand .  · Avoid contact with people who have cold symptoms.  · Try not to touch your hands to your mouth, nose, or eyes.  · Make sure to get the flu shot every year.  Contact a doctor if:  · Your symptoms do not get better in 2 weeks.  Get help right away if:  · You cough up blood.  · You have chest pain.  · You have very bad shortness of breath.  · You become dehydrated.  · You faint (pass out) or keep feeling like you are going to pass out.  · You keep throwing up (vomiting).  · You have a very bad headache.  · Your fever or chills gets worse.  This information is not intended to replace advice given to you by your health care provider. Make sure you discuss any questions you have with your health care provider.  Document Released: 06/05/2009  Document Revised: 07/26/2017 Document Reviewed: 06/07/2017  [employer] Interactive Patient Education © 2017 [employer]

## 2020-02-19 ENCOUNTER — HOSPITAL ENCOUNTER (OUTPATIENT)
Dept: RADIOLOGY | Facility: MEDICAL CENTER | Age: 45
End: 2020-02-19
Attending: FAMILY MEDICINE
Payer: COMMERCIAL

## 2020-02-19 ENCOUNTER — OFFICE VISIT (OUTPATIENT)
Dept: URGENT CARE | Facility: PHYSICIAN GROUP | Age: 45
End: 2020-02-19
Payer: COMMERCIAL

## 2020-02-19 VITALS
TEMPERATURE: 99.5 F | HEIGHT: 75 IN | HEART RATE: 93 BPM | DIASTOLIC BLOOD PRESSURE: 78 MMHG | RESPIRATION RATE: 14 BRPM | SYSTOLIC BLOOD PRESSURE: 128 MMHG | OXYGEN SATURATION: 96 % | WEIGHT: 240 LBS | BODY MASS INDEX: 29.84 KG/M2

## 2020-02-19 DIAGNOSIS — R05.3 CHRONIC COUGH: ICD-10-CM

## 2020-02-19 PROCEDURE — 71046 X-RAY EXAM CHEST 2 VIEWS: CPT

## 2020-02-19 PROCEDURE — 99214 OFFICE O/P EST MOD 30 MIN: CPT | Performed by: FAMILY MEDICINE

## 2020-02-19 RX ORDER — BENZONATATE 200 MG/1
200 CAPSULE ORAL 3 TIMES DAILY PRN
Qty: 30 CAP | Refills: 0 | Status: SHIPPED | OUTPATIENT
Start: 2020-02-19 | End: 2022-07-25

## 2020-02-19 ASSESSMENT — ENCOUNTER SYMPTOMS
WEIGHT LOSS: 0
VOMITING: 0
NAUSEA: 0
EYE DISCHARGE: 0
EYE REDNESS: 0
MYALGIAS: 0

## 2020-02-20 ENCOUNTER — APPOINTMENT (OUTPATIENT)
Dept: MEDICAL GROUP | Facility: PHYSICIAN GROUP | Age: 45
End: 2020-02-20
Payer: COMMERCIAL

## 2020-02-20 NOTE — PROGRESS NOTES
"Subjective:      Willie Smith is a 45 y.o. male who presents with Cough (Dry, x 3 months)            3-4 months cough. Cough is now dry. Treated x2 with abx and CS once. He improved but did not resolve. No fever. No PMH asthma/copd/pneumonia.  His lisinopril dose was increased around the same time.  No other aggravating or alleviating factors.        Review of Systems   Constitutional: Negative for malaise/fatigue and weight loss.   Eyes: Negative for discharge and redness.   Cardiovascular: Negative for leg swelling.   Gastrointestinal: Negative for nausea and vomiting.   Musculoskeletal: Negative for joint pain and myalgias.   Skin: Negative for itching and rash.     .  Medications, Allergies, and current problem list reviewed today in Epic       Objective:     /78   Pulse 93   Temp 37.5 °C (99.5 °F)   Resp 14   Ht 1.905 m (6' 3\")   Wt 108.9 kg (240 lb)   SpO2 96%   BMI 30.00 kg/m²      Physical Exam  Constitutional:       General: He is not in acute distress.     Appearance: He is well-developed.   HENT:      Head: Normocephalic and atraumatic.      Nose: Nose normal. No congestion or rhinorrhea.      Mouth/Throat:      Mouth: Mucous membranes are moist.      Pharynx: Oropharynx is clear. No posterior oropharyngeal erythema.   Eyes:      Conjunctiva/sclera: Conjunctivae normal.   Cardiovascular:      Rate and Rhythm: Normal rate and regular rhythm.      Heart sounds: Normal heart sounds. No murmur.   Pulmonary:      Effort: Pulmonary effort is normal.      Breath sounds: Normal breath sounds. No wheezing.   Skin:     General: Skin is warm and dry.      Findings: No rash.   Neurological:      Mental Status: He is alert and oriented to person, place, and time.                 Assessment/Plan:     CXR: no acute cardiopulmonary process per radiology    1. Chronic cough  DX-CHEST-2 VIEWS    benzonatate (TESSALON) 200 MG capsule     Differential diagnosis, natural history, supportive care, and " indications for immediate follow-up discussed at length.     Very likely ACE inhibitor cough.  Recommend that he discuss this with his primary care as soon as possible.

## 2020-03-05 ENCOUNTER — OFFICE VISIT (OUTPATIENT)
Dept: MEDICAL GROUP | Facility: PHYSICIAN GROUP | Age: 45
End: 2020-03-05
Payer: COMMERCIAL

## 2020-03-05 VITALS
HEIGHT: 75 IN | HEART RATE: 89 BPM | RESPIRATION RATE: 16 BRPM | SYSTOLIC BLOOD PRESSURE: 142 MMHG | WEIGHT: 249.4 LBS | DIASTOLIC BLOOD PRESSURE: 82 MMHG | TEMPERATURE: 98.7 F | BODY MASS INDEX: 31.01 KG/M2 | OXYGEN SATURATION: 97 %

## 2020-03-05 DIAGNOSIS — I10 ESSENTIAL HYPERTENSION: ICD-10-CM

## 2020-03-05 DIAGNOSIS — T46.4X5A ACE-INHIBITOR COUGH: ICD-10-CM

## 2020-03-05 DIAGNOSIS — Z23 NEED FOR VACCINATION: ICD-10-CM

## 2020-03-05 DIAGNOSIS — R05.8 ACE-INHIBITOR COUGH: ICD-10-CM

## 2020-03-05 DIAGNOSIS — S91.311A LACERATION OF RIGHT FOOT, INITIAL ENCOUNTER: ICD-10-CM

## 2020-03-05 PROBLEM — R05.9 COUGH: Status: ACTIVE | Noted: 2020-03-05

## 2020-03-05 PROCEDURE — 90715 TDAP VACCINE 7 YRS/> IM: CPT | Performed by: NURSE PRACTITIONER

## 2020-03-05 PROCEDURE — 99214 OFFICE O/P EST MOD 30 MIN: CPT | Mod: 25 | Performed by: NURSE PRACTITIONER

## 2020-03-05 PROCEDURE — 90471 IMMUNIZATION ADMIN: CPT | Performed by: NURSE PRACTITIONER

## 2020-03-05 RX ORDER — LOSARTAN POTASSIUM 100 MG/1
100 TABLET ORAL DAILY
Qty: 90 EACH | Refills: 0 | Status: SHIPPED | OUTPATIENT
Start: 2020-03-05 | End: 2020-06-01

## 2020-03-05 ASSESSMENT — ENCOUNTER SYMPTOMS
HEADACHES: 0
WHEEZING: 0
CHILLS: 0
COUGH: 1
PALPITATIONS: 0
DIZZINESS: 0
SHORTNESS OF BREATH: 0
FEVER: 0

## 2020-03-06 NOTE — ASSESSMENT & PLAN NOTE
Patient was seen at [facility] urgent care on 1/15/20 for symptoms of bronchitis.  He was prescribed doxycycline and prednisone burst.  He was seen again on 2/19/2020 for dry cough.  He was prescribed Tessalon and chest x-ray was ordered.  Chest x-ray showed no abnormal findings.  Patient is on lisinopril 40 mg daily which was recently increased.  It was advised at urgent care that his cough may be related to ACE inhibitor side effect.    He continues to experience dry, intermittent cough. He does admit that he decreased lisinopril to 20mg daily, which has provided some relief.

## 2020-03-06 NOTE — ASSESSMENT & PLAN NOTE
He reports that he was cleaning yesterday and used his R foot to stop on object to break it down, which resulted in laceration to bottom of his foot. Area is painful to touch and applying pressure with walking. He reports minimal amount of blood. He denies redness, swelling, or purulent drainage. He has been cleaning wound with soap and epsom salt soaks which has provided some relief. He is unsure when he had his last Tdap vaccination.

## 2020-03-06 NOTE — PROGRESS NOTES
Subjective:   Willie Smith is a 45 y.o. male here today for the following concerns:    Cough  Patient was seen at [facility] urgent care on 1/15/20 for symptoms of bronchitis.  He was prescribed doxycycline and prednisone burst.  He was seen again on 2/19/2020 for dry cough.  He was prescribed Tessalon and chest x-ray was ordered.  Chest x-ray showed no abnormal findings.  Patient is on lisinopril 40 mg daily which was recently increased.  It was advised at urgent care that his cough may be related to ACE inhibitor side effect.    He continues to experience dry, intermittent cough. He does admit that he decreased lisinopril to 20mg daily, which has provided some relief.    Laceration of right foot  He reports that he was cleaning yesterday and used his R foot to stop on object to break it down, which resulted in laceration to bottom of his foot. Area is painful to touch and applying pressure with walking. He reports minimal amount of blood. He denies redness, swelling, or purulent drainage. He has been cleaning wound with soap and epsom salt soaks which has provided some relief. He is unsure when he had his last Tdap vaccination.    There are no preventive care reminders to display for this patient.    Current medicines (including changes today)  Current Outpatient Medications   Medication Sig Dispense Refill   • losartan (COZAAR) 100 MG Tab Take 1 Tab by mouth every day for 90 days. 90 Each 0   • benzonatate (TESSALON) 200 MG capsule Take 1 Cap by mouth 3 times a day as needed for Cough. 30 Cap 0   • [START ON 3/11/2020] methylphenidate (CONCERTA) 36 MG CR tablet Take 1 Tab by mouth every morning for 30 days. 30 Tab 0   • Zolpidem Tartrate 3.5 MG SL Tab Place 1 Tab under tongue every bedtime.     • Methylphenidate HCl ER 36 MG TABLET SR 24 HR Take 1 Tab by mouth every day.     • fluoxetine (PROZAC) 40 MG capsule Take 40 mg by mouth every day.     • buPROPion SR (WELLBUTRIN-SR) 150 MG TABLET SR 12 HR  sustained-release tablet Take 1 Tab by mouth every day. 90 Tab 1   • topiramate (TOPAMAX) 25 MG Tab Take 1 Tab by mouth every day. 90 Tab 1   • pantoprazole (PROTONIX) 40 MG Tablet Delayed Response Take 40 mg by mouth every day.     • Azelastine HCl (ASTEPRO) 0.15 % Solution Spray  in nose.       No current facility-administered medications for this visit.      He  has a past medical history of ADD (attention deficit disorder), Depression, GERD (gastroesophageal reflux disease), Hypertension, and Tinnitus.    Social History     Socioeconomic History   • Marital status:      Spouse name: Not on file   • Number of children: Not on file   • Years of education: Not on file   • Highest education level: Not on file   Occupational History   • Not on file   Social Needs   • Financial resource strain: Not on file   • Food insecurity     Worry: Not on file     Inability: Not on file   • Transportation needs     Medical: Not on file     Non-medical: Not on file   Tobacco Use   • Smoking status: Former Smoker     Packs/day: 0.00     Years: 3.00     Pack years: 0.00     Last attempt to quit: 2005     Years since quittin.5   • Smokeless tobacco: Former User     Types: Chew   • Tobacco comment: 1-2   Substance and Sexual Activity   • Alcohol use: Yes     Frequency: 2-3 times a week     Drinks per session: 1 or 2     Comment: 1-2 drinks a day   • Drug use: No   • Sexual activity: Not on file   Lifestyle   • Physical activity     Days per week: Not on file     Minutes per session: Not on file   • Stress: Not on file   Relationships   • Social connections     Talks on phone: Not on file     Gets together: Not on file     Attends [employer] service: Not on file     Active member of club or organization: Not on file     Attends meetings of clubs or organizations: Not on file     Relationship status: Not on file   • Intimate partner violence     Fear of current or ex partner: Not on file     Emotionally abused: Not on  "file     Physically abused: Not on file     Forced sexual activity: Not on file   Other Topics Concern   • Not on file   Social History Narrative   • Not on file       Review of Systems   Constitutional: Negative for chills and fever.   Respiratory: Positive for cough. Negative for shortness of breath and wheezing.    Cardiovascular: Negative for chest pain, palpitations and leg swelling.   Neurological: Negative for dizziness and headaches.        Objective:     /82 (BP Location: Left arm, Patient Position: Sitting)   Pulse 89   Temp 37.1 °C (98.7 °F) (Tympanic)   Resp 16   Ht 1.905 m (6' 3\")   Wt 113.1 kg (249 lb 6.4 oz)   SpO2 97%  Body mass index is 31.17 kg/m².     Physical Exam:  Constitutional: Oriented to person, place, and time and well-developed, well-nourished, and in no distress.   HENT:   Head: Normocephalic and atraumatic.   Eyes: Conjunctivae and EOM are normal. Pupils are equal, round, and reactive to light.   Neck: Normal range of motion. Neck supple.   Cardiovascular: Normal rate, regular rhythm, normal heart sounds. Exam reveals no friction rub. No murmur heard.  Pulmonary/Chest: Dry, intermittent cough noted. Effort normal and breath sounds normal. No respiratory distress or use of accessory muscles. No wheezes, rhonchi, or rales.   Neurological: Alert and oriented to person, place, and time.   Skin: Skin is warm and dry. No cyanosis. No edema. Laceration approx size of dime  located plantar aspect of R food, no redness, drainage, or swelling noted.  Psychiatric: Mood, memory, affect and judgment normal.       Assessment and Plan:   The following treatment plan was discussed    1. ACE-inhibitor cough  Reviewed Urgent Care progress notes. I suspect his symptoms are related to side effect of ACE inhibitor. Advised that we will d/c lisinopril and initiate treatment with losartan. If symptoms do not improve, I will refer him to pulmonology for further evaluation.    2. Essential " hypertension  D/C lisinopril and initiate treatment with losartan.  - losartan (COZAAR) 100 MG Tab; Take 1 Tab by mouth every day for 90 days.  Dispense: 90 Each; Refill: 0    3. Laceration of right foot, initial encounter  Tdap vaccination given. Advised to continue monitoring site for signs of infection. Cleanse wound daily, apply triple antibiotic ointment, and cover.  - Tdap =>8yo IM    4. Need for vaccination  I have placed the below orders and discussed them with an approved delegating provider.  The MA is performing the below orders under the direction of Dr. Greco.   - Tdap =>8yo IM      Followup: Return in about 1 month (around 4/5/2020), or if symptoms worsen or fail to improve, for For follow-up on, HTN.

## 2020-04-06 ENCOUNTER — TELEPHONE (OUTPATIENT)
Dept: MEDICAL GROUP | Facility: PHYSICIAN GROUP | Age: 45
End: 2020-04-06

## 2020-04-06 NOTE — TELEPHONE ENCOUNTER
Left voicemail regarding appointment scheduled on 4/8/20. I did offer patient telephone consultation.    SILAS Manuel.,SHAILAP-C

## 2020-05-31 DIAGNOSIS — I10 ESSENTIAL HYPERTENSION: ICD-10-CM

## 2020-06-01 RX ORDER — LOSARTAN POTASSIUM 100 MG/1
TABLET ORAL
Qty: 30 TAB | Refills: 0 | Status: SHIPPED | OUTPATIENT
Start: 2020-06-01 | End: 2020-06-29

## 2022-01-17 NOTE — LETTER
January 15, 2020       Patient: Willie Smith   YOB: 1975   Date of Visit: 1/15/2020         To Whom It May Concern:    It is my medical opinion that Willie Smith may be excused from work for the dates of 1/15/20-1/18/20.      If you have any questions or concerns, please don't hesitate to call [phone]          Sincerely,          Flaquito Cruz P.A.-C.  Electronically Signed      Mavis Barker is here for her Sjogren's and to review her bone density  She never did her labs    No fever chills sweats chest pain cough wheezing shortness of breath GERD abdominal pain nausea vomit diarrhea no back pain does not take calcium vitamin D no formal regular exercise  History right wrist fracture earlier in 2021 from   trauma    Bone density reviewed with the patient osteoporosis lumbar spine osteopenia hip region  EXAM: BD DXA AXIAL SKELETON        CLINICAL HISTORY: Post menopause        COMPARISON: None.        FINDINGS:  Lumbar spine: The total bone mineral density of L1-L4 is 0.684 g/sq cm with  a  T-score of -3.3.     Left femur: The total bone mineral density of the left hip is 0.701 g/sq cm  with a T score of  -2.0.  The bone mineral density of the left femoral neck is 0.586 g/sq cm with a T  score of  -2.4.        IMPRESSION:  1. The bone mineral density is consistent with osteoporosis. There is high  risk for fracture.  2. Consider treatment for osteoporosis.  3. Follow-up as clinically indicated.     Electronically Signed by: LEAH RHODES MD   Signed on: 9/27/2021 3:57 PM     Menopause was approximately age 49  Mother she believes had a history of osteoporosis        Current Medications:  Current Outpatient Medications   Medication Sig Dispense Refill   • acetaminophen-codeine (TYLENOL NO.3) 300-30 MG per tablet Take 1 tablet by mouth every 6 hours as needed for Pain. 20 tablet 0   • levothyroxine 75 MCG tablet Take 1 tablet by mouth daily. 90 tablet 3     No current facility-administered medications for this visit.      Most Recent Labs:    WHITE BLOOD CELL COUNT   Date Value Ref Range Status   07/15/2021 5.1 4.0 - 10.0 10*3/uL Final     HEMATOCRIT   Date Value Ref Range Status   07/15/2021 39.1 34.0 - 45.0 % Final     HEMOGLOBIN   Date Value Ref Range Status   07/15/2021 12.6 11.2 - 15.7 g/dL Final     PLATELET COUNT   Date Value Ref Range Status   07/15/2021 270 150 - 400 10*3/uL Final       NA (SODIUM, SERUM)   Date Value Ref Range Status   07/15/2021 144 136 - 146 mmol/L Final     K (POTASSIUM, SERUM)   Date Value Ref Range Status   07/15/2021 3.9 3.5 - 5.3 mmol/L Final     CHLORIDE, SERUM   Date Value Ref Range Status   07/15/2021 104 96 - 107 mmol/L Final     GLUCOSE, RANDOM   Date Value Ref Range Status   07/15/2021 91 70 - 200 mg/dL Final     CALCIUM, SERUM   Date Value Ref Range Status   07/19/2021 9.8 8.6 - 10.6 mg/dL Final     CO2 VENOUS   Date Value Ref Range Status   07/15/2021 23 22 - 32 mmol/L Final     BLOOD UREA NITROGEN   Date Value Ref Range Status   07/15/2021 16 7 - 20 mg/dL Final     CREATININE, SERUM   Date Value Ref Range Status   07/15/2021 0.6 0.5 - 1.4 mg/dL Final     SEDIMENTATION RATE, RBC   Date Value Ref Range Status   07/15/2021 23 (H) 0 - 20 mm/h Final     No results found for: CREACTIVEPRO  No results found for: RHEUMATOIDFA  No results found for: CYCLICCITRUL  AUNG SYMPHONY   Date Value Ref Range Status   01/02/2016 >= 32 0.0 - 0.6 RATIO Final     AUNG DNA, DOUBLE STRANDED   Date Value Ref Range Status   01/02/2016 6.5 NEGATIVE 0.0 - 10.0 [IU]/mL Final     No results found for: ELIAUONERNPA, [id number], ELIASMSMITHA, [id number], ELIASS-B/LA, ELIACENTROME, ELIASCL-70, ELIAJO-1      WHITE BLOOD CELL COUNT   Date Value Ref Range Status   07/15/2021 5.1 4.0 - 10.0 10*3/uL Final   01/15/2021 4.9 4.0 - 10.0 10*3/uL Final   07/16/2020 6.6 4.0 - 10.0 10*3/uL Final     HEMATOCRIT   Date Value Ref Range Status   07/15/2021 39.1 34.0 - 45.0 % Final   01/15/2021 39.8 34.0 - 45.0 % Final   07/16/2020 37.8 34.0 - 45.0 % Final     HEMOGLOBIN   Date Value Ref Range Status   07/15/2021 12.6 11.2 - 15.7 g/dL Final   01/15/2021 13.0 11.2 - 15.7 g/dL Final   07/16/2020 12.7 11.2 - 15.7 g/dL Final     PLATELET COUNT   Date Value Ref Range Status   07/15/2021 270 150 - 400 10*3/uL Final   01/15/2021 344 150 - 400 10*3/uL Final   07/16/2020 270 150 - 400 10*3/uL Final     NA (SODIUM,  SERUM)   Date Value Ref Range Status   07/15/2021 144 136 - 146 mmol/L Final   01/15/2021 138 136 - 146 mmol/L Final   07/16/2020 138 136 - 146 mmol/L Final     K (POTASSIUM, SERUM)   Date Value Ref Range Status   07/15/2021 3.9 3.5 - 5.3 mmol/L Final   01/15/2021 4.2 3.5 - 5.3 mmol/L Final   07/16/2020 4.1 3.5 - 5.3 mmol/L Final     CHLORIDE, SERUM   Date Value Ref Range Status   07/15/2021 104 96 - 107 mmol/L Final   01/15/2021 103 96 - 107 mmol/L Final   07/16/2020 102 96 - 107 mmol/L Final     GLUCOSE, RANDOM   Date Value Ref Range Status   07/15/2021 91 70 - 200 mg/dL Final   07/16/2020 95 70 - 200 mg/dL Final   07/23/2019 86 70 - 200 mg/dL Final     CALCIUM, SERUM   Date Value Ref Range Status   07/19/2021 9.8 8.6 - 10.6 mg/dL Final   07/15/2021 10.7 (H) 8.6 - 10.6 mg/dL Final   01/15/2021 10.0 8.6 - 10.6 mg/dL Final     CO2 VENOUS   Date Value Ref Range Status   07/15/2021 23 22 - 32 mmol/L Final   01/15/2021 24 22 - 32 mmol/L Final   07/16/2020 28 22 - 32 mmol/L Final     BLOOD UREA NITROGEN   Date Value Ref Range Status   07/15/2021 16 7 - 20 mg/dL Final   01/15/2021 13 7 - 20 mg/dL Final   07/16/2020 13 7 - 20 mg/dL Final     CREATININE, SERUM   Date Value Ref Range Status   07/15/2021 0.6 0.5 - 1.4 mg/dL Final   01/15/2021 0.7 0.5 - 1.4 mg/dL Final   07/16/2020 0.6 0.5 - 1.4 mg/dL Final     SEDIMENTATION RATE, RBC   Date Value Ref Range Status   07/15/2021 23 (H) 0 - 20 mm/h Final   07/16/2020 31 (H) 0 - 20 mm/h Final   07/23/2019 27 (H) 0 - 20 mm/h Final         Visit Vitals  /64   Wt 57.6 kg (127 lb)   BMI 21.13 kg/m²     PHYSICAL EXAM       Skin:  No rash  HEENT:  No eye erythema, icterus   Neck:  No parotid gland enlargement, no adenopathy  Lungs:  Clear to auscultation  Heart:  RRR S1, S2, no murmurs or rubs  Abdomen:  Soft, non tender, no hepatomegaly  Extremities:  No edema  Musculoskeletal:  no swelling, redness or warmth of the DIP's, PIPs or MCPs, wrists, elbows, shoulders, hips, knees,  ankles,   No pain on palpation.  Full range of motion of joints.        ASSESSMENT/PLAN  Patient is a 56-year-old female with a history of Sjogren's here for follow-up visit  Sjogren's stable labs to be done   Osteoporosis lumbar spine osteopenia of the hip region bone density as below    FINDINGS:  Lumbar spine: The total bone mineral density of L1-L4 is 0.684 g/sq cm with  a  T-score of -3.3.     Left femur: The total bone mineral density of the left hip is 0.701 g/sq cm  with a T score of  -2.0.  The bone mineral density of the left femoral neck is 0.586 g/sq cm with a T  score of  -2.4.        IMPRESSION:  1. The bone mineral density is consistent with osteoporosis. There is high  risk for fracture.  2. Consider treatment for osteoporosis.  3. Follow-up as clinically indicated.     Electronically Signed by: LEAH RHODES MD   Signed on: 9/27/2021 3:57 PM     With history of        IMPRESSION:  1.  Impacted comminuted fracture involving the distal radius with intra-articular extension. Please see above for additional findings. This report was marked for followup in Epic at time of interpretation.     Electronically Signed by: COLEEN ESCAMILLA MD   Signed on: 1/2/2021 9:11 AM      We discussed osteoporosis recommended 600 mg of calcium with vitamin D twice daily weightbearing exercise discussed  Labs today vitamin D intact parathyroid CBC BMP hepatic    If the patient is agreeable alendronate after labs she was told how to take when to take it  Risk GI GERD affecting the esophagus stomach  Jaw necrosis femur fracture achiness of the muscles and joints all discussed encouraged to read the information when received  Recommended due to her bone density her osteoporosis previous wrist fracture her family history  Will limit to 5 years of treatment  Repeat bone density October 2023    Return in 6 months  30 minutes total spent face to face with the patient , reviewing history/records/tests, and documenting in the electronic  medical record.     Above narrative may have been transcribed using voice recognition software and thus accuracy and grammar are subject to errors.                 Rolf Belcher MD

## 2022-07-25 ENCOUNTER — OFFICE VISIT (OUTPATIENT)
Dept: MEDICAL GROUP | Facility: LAB | Age: 47
End: 2022-07-25
Payer: COMMERCIAL

## 2022-07-25 ENCOUNTER — HOSPITAL ENCOUNTER (OUTPATIENT)
Dept: LAB | Facility: MEDICAL CENTER | Age: 47
End: 2022-07-25
Attending: PHYSICIAN ASSISTANT
Payer: COMMERCIAL

## 2022-07-25 ENCOUNTER — HOSPITAL ENCOUNTER (OUTPATIENT)
Facility: MEDICAL CENTER | Age: 47
End: 2022-07-25
Attending: PHYSICIAN ASSISTANT
Payer: COMMERCIAL

## 2022-07-25 VITALS
TEMPERATURE: 97.1 F | OXYGEN SATURATION: 95 % | SYSTOLIC BLOOD PRESSURE: 112 MMHG | HEIGHT: 74 IN | WEIGHT: 240 LBS | DIASTOLIC BLOOD PRESSURE: 70 MMHG | HEART RATE: 82 BPM | BODY MASS INDEX: 30.8 KG/M2 | RESPIRATION RATE: 16 BRPM

## 2022-07-25 DIAGNOSIS — F90.9 ATTENTION DEFICIT HYPERACTIVITY DISORDER (ADHD), UNSPECIFIED ADHD TYPE: ICD-10-CM

## 2022-07-25 DIAGNOSIS — F51.01 PRIMARY INSOMNIA: ICD-10-CM

## 2022-07-25 DIAGNOSIS — H93.13 TINNITUS OF BOTH EARS: ICD-10-CM

## 2022-07-25 DIAGNOSIS — Z13.220 LIPID SCREENING: ICD-10-CM

## 2022-07-25 DIAGNOSIS — K12.1 ULCERATION, ORAL MUCOSA: ICD-10-CM

## 2022-07-25 DIAGNOSIS — E29.1 HYPOTESTOSTERONEMIA IN MALE: ICD-10-CM

## 2022-07-25 PROBLEM — R05.9 COUGH: Status: RESOLVED | Noted: 2020-03-05 | Resolved: 2022-07-25

## 2022-07-25 PROBLEM — M54.41 CHRONIC BILATERAL LOW BACK PAIN WITH BILATERAL SCIATICA: Status: ACTIVE | Noted: 2022-03-19

## 2022-07-25 PROBLEM — N40.0 BENIGN PROSTATIC HYPERPLASIA: Status: ACTIVE | Noted: 2022-03-18

## 2022-07-25 PROBLEM — G47.33 OBSTRUCTIVE SLEEP APNEA SYNDROME: Status: ACTIVE | Noted: 2018-01-29

## 2022-07-25 PROBLEM — R79.89 LOW TESTOSTERONE: Status: ACTIVE | Noted: 2021-06-10

## 2022-07-25 PROBLEM — G89.29 CHRONIC BILATERAL LOW BACK PAIN WITH BILATERAL SCIATICA: Status: ACTIVE | Noted: 2022-03-19

## 2022-07-25 PROBLEM — F98.8 ADD (ATTENTION DEFICIT DISORDER) WITHOUT HYPERACTIVITY: Status: ACTIVE | Noted: 2018-03-06

## 2022-07-25 PROBLEM — F33.1 MODERATE EPISODE OF RECURRENT MAJOR DEPRESSIVE DISORDER (HCC): Status: ACTIVE | Noted: 2018-03-06

## 2022-07-25 PROBLEM — G47.00 INSOMNIA: Status: ACTIVE | Noted: 2018-01-12

## 2022-07-25 PROBLEM — M54.42 CHRONIC BILATERAL LOW BACK PAIN WITH BILATERAL SCIATICA: Status: ACTIVE | Noted: 2022-03-19

## 2022-07-25 PROBLEM — I10 HYPERTENSION: Status: ACTIVE | Noted: 2019-11-25

## 2022-07-25 LAB
BASOPHILS # BLD AUTO: 0.4 % (ref 0–1.8)
BASOPHILS # BLD: 0.03 K/UL (ref 0–0.12)
EOSINOPHIL # BLD AUTO: 0.08 K/UL (ref 0–0.51)
EOSINOPHIL NFR BLD: 1.1 % (ref 0–6.9)
ERYTHROCYTE [DISTWIDTH] IN BLOOD BY AUTOMATED COUNT: 41.4 FL (ref 35.9–50)
HCT VFR BLD AUTO: 50.6 % (ref 42–52)
HGB BLD-MCNC: 16.9 G/DL (ref 14–18)
IMM GRANULOCYTES # BLD AUTO: 0.02 K/UL (ref 0–0.11)
IMM GRANULOCYTES NFR BLD AUTO: 0.3 % (ref 0–0.9)
LYMPHOCYTES # BLD AUTO: 2.03 K/UL (ref 1–4.8)
LYMPHOCYTES NFR BLD: 29.1 % (ref 22–41)
MCH RBC QN AUTO: 31.2 PG (ref 27–33)
MCHC RBC AUTO-ENTMCNC: 33.4 G/DL (ref 33.7–35.3)
MCV RBC AUTO: 93.4 FL (ref 81.4–97.8)
MONOCYTES # BLD AUTO: 0.46 K/UL (ref 0–0.85)
MONOCYTES NFR BLD AUTO: 6.6 % (ref 0–13.4)
NEUTROPHILS # BLD AUTO: 4.35 K/UL (ref 1.82–7.42)
NEUTROPHILS NFR BLD: 62.5 % (ref 44–72)
NRBC # BLD AUTO: 0 K/UL
NRBC BLD-RTO: 0 /100 WBC
PLATELET # BLD AUTO: 284 K/UL (ref 164–446)
PMV BLD AUTO: 11.5 FL (ref 9–12.9)
RBC # BLD AUTO: 5.42 M/UL (ref 4.7–6.1)
WBC # BLD AUTO: 7 K/UL (ref 4.8–10.8)

## 2022-07-25 PROCEDURE — 80053 COMPREHEN METABOLIC PANEL: CPT

## 2022-07-25 PROCEDURE — 87070 CULTURE OTHR SPECIMN AEROBIC: CPT

## 2022-07-25 PROCEDURE — 87205 SMEAR GRAM STAIN: CPT

## 2022-07-25 PROCEDURE — 87186 SC STD MICRODIL/AGAR DIL: CPT

## 2022-07-25 PROCEDURE — 36415 COLL VENOUS BLD VENIPUNCTURE: CPT

## 2022-07-25 PROCEDURE — 87077 CULTURE AEROBIC IDENTIFY: CPT

## 2022-07-25 PROCEDURE — 84443 ASSAY THYROID STIM HORMONE: CPT

## 2022-07-25 PROCEDURE — 84270 ASSAY OF SEX HORMONE GLOBUL: CPT

## 2022-07-25 PROCEDURE — 84402 ASSAY OF FREE TESTOSTERONE: CPT

## 2022-07-25 PROCEDURE — 99214 OFFICE O/P EST MOD 30 MIN: CPT | Performed by: PHYSICIAN ASSISTANT

## 2022-07-25 PROCEDURE — 84403 ASSAY OF TOTAL TESTOSTERONE: CPT

## 2022-07-25 PROCEDURE — 84153 ASSAY OF PSA TOTAL: CPT

## 2022-07-25 PROCEDURE — 80061 LIPID PANEL: CPT

## 2022-07-25 PROCEDURE — 85025 COMPLETE CBC W/AUTO DIFF WBC: CPT

## 2022-07-25 RX ORDER — SILDENAFIL 50 MG/1
50 TABLET, FILM COATED ORAL
COMMUNITY

## 2022-07-25 RX ORDER — TAMSULOSIN HYDROCHLORIDE 0.4 MG/1
0.4 CAPSULE ORAL
COMMUNITY
End: 2023-03-03 | Stop reason: SDUPTHER

## 2022-07-25 RX ORDER — ZOLPIDEM TARTRATE 3.5 MG/1
1 TABLET SUBLINGUAL
Qty: 30 TABLET | Refills: 0 | Status: SHIPPED | OUTPATIENT
Start: 2022-07-25 | End: 2022-08-24

## 2022-07-25 RX ORDER — BUPROPION HYDROCHLORIDE 300 MG/1
1 TABLET ORAL EVERY MORNING
COMMUNITY
Start: 2022-04-08 | End: 2023-03-03 | Stop reason: SDUPTHER

## 2022-07-25 RX ORDER — HYDROCHLOROTHIAZIDE 12.5 MG/1
12.5 TABLET ORAL DAILY
COMMUNITY
End: 2023-05-01 | Stop reason: SDUPTHER

## 2022-07-25 RX ORDER — TESTOSTERONE CYPIONATE 200 MG/ML
INJECTION, SOLUTION INTRAMUSCULAR
COMMUNITY
Start: 2022-07-08 | End: 2022-09-30

## 2022-07-25 RX ORDER — TOPIRAMATE 50 MG/1
TABLET, FILM COATED ORAL
COMMUNITY
Start: 2022-06-12 | End: 2022-12-06

## 2022-07-25 RX ORDER — NAPROXEN 500 MG/1
500 TABLET ORAL 2 TIMES DAILY WITH MEALS
COMMUNITY
End: 2024-01-23 | Stop reason: SDUPTHER

## 2022-07-25 RX ORDER — METHYLPHENIDATE HYDROCHLORIDE 36 MG/1
1 TABLET, EXTENDED RELEASE ORAL DAILY
Qty: 30 TABLET | Refills: 0 | Status: SHIPPED | OUTPATIENT
Start: 2022-07-25 | End: 2022-08-24

## 2022-07-25 SDOH — ECONOMIC STABILITY: HOUSING INSECURITY
IN THE LAST 12 MONTHS, WAS THERE A TIME WHEN YOU DID NOT HAVE A STEADY PLACE TO SLEEP OR SLEPT IN A SHELTER (INCLUDING NOW)?: NO

## 2022-07-25 SDOH — ECONOMIC STABILITY: TRANSPORTATION INSECURITY
IN THE PAST 12 MONTHS, HAS LACK OF RELIABLE TRANSPORTATION KEPT YOU FROM MEDICAL APPOINTMENTS, MEETINGS, WORK OR FROM GETTING THINGS NEEDED FOR DAILY LIVING?: NO

## 2022-07-25 SDOH — HEALTH STABILITY: PHYSICAL HEALTH: ON AVERAGE, HOW MANY MINUTES DO YOU ENGAGE IN EXERCISE AT THIS LEVEL?: 30 MIN

## 2022-07-25 SDOH — ECONOMIC STABILITY: FOOD INSECURITY: WITHIN THE PAST 12 MONTHS, THE FOOD YOU BOUGHT JUST DIDN'T LAST AND YOU DIDN'T HAVE MONEY TO GET MORE.: NEVER TRUE

## 2022-07-25 SDOH — ECONOMIC STABILITY: FOOD INSECURITY: WITHIN THE PAST 12 MONTHS, YOU WORRIED THAT YOUR FOOD WOULD RUN OUT BEFORE YOU GOT MONEY TO BUY MORE.: NEVER TRUE

## 2022-07-25 SDOH — ECONOMIC STABILITY: HOUSING INSECURITY: IN THE LAST 12 MONTHS, HOW MANY PLACES HAVE YOU LIVED?: 3

## 2022-07-25 SDOH — ECONOMIC STABILITY: INCOME INSECURITY: IN THE LAST 12 MONTHS, WAS THERE A TIME WHEN YOU WERE NOT ABLE TO PAY THE MORTGAGE OR RENT ON TIME?: YES

## 2022-07-25 SDOH — ECONOMIC STABILITY: HOUSING INSECURITY
IN THE LAST 12 MONTHS, WAS THERE A TIME WHEN YOU DID NOT HAVE A STEADY PLACE TO SLEEP OR SLEPT IN A SHELTER (INCLUDING NOW)?: YES

## 2022-07-25 SDOH — ECONOMIC STABILITY: TRANSPORTATION INSECURITY
IN THE PAST 12 MONTHS, HAS THE LACK OF TRANSPORTATION KEPT YOU FROM MEDICAL APPOINTMENTS OR FROM GETTING MEDICATIONS?: NO

## 2022-07-25 SDOH — ECONOMIC STABILITY: INCOME INSECURITY: HOW HARD IS IT FOR YOU TO PAY FOR THE VERY BASICS LIKE FOOD, HOUSING, MEDICAL CARE, AND HEATING?: NOT HARD AT ALL

## 2022-07-25 SDOH — HEALTH STABILITY: PHYSICAL HEALTH: ON AVERAGE, HOW MANY DAYS PER WEEK DO YOU ENGAGE IN MODERATE TO STRENUOUS EXERCISE (LIKE A BRISK WALK)?: 5 DAYS

## 2022-07-25 SDOH — HEALTH STABILITY: MENTAL HEALTH
STRESS IS WHEN SOMEONE FEELS TENSE, NERVOUS, ANXIOUS, OR CAN'T SLEEP AT NIGHT BECAUSE THEIR MIND IS TROUBLED. HOW STRESSED ARE YOU?: RATHER MUCH

## 2022-07-25 SDOH — ECONOMIC STABILITY: TRANSPORTATION INSECURITY
IN THE PAST 12 MONTHS, HAS LACK OF TRANSPORTATION KEPT YOU FROM MEETINGS, WORK, OR FROM GETTING THINGS NEEDED FOR DAILY LIVING?: NO

## 2022-07-25 ASSESSMENT — SOCIAL DETERMINANTS OF HEALTH (SDOH)
HOW OFTEN DO YOU GET TOGETHER WITH FRIENDS OR RELATIVES?: ONCE A WEEK
HOW OFTEN DO YOU ATTEND CHURCH OR RELIGIOUS SERVICES?: NEVER
HOW OFTEN DO YOU ATTENT MEETINGS OF THE CLUB OR ORGANIZATION YOU BELONG TO?: MORE THAN 4 TIMES PER YEAR
HOW OFTEN DO YOU HAVE A DRINK CONTAINING ALCOHOL: 4 OR MORE TIMES A WEEK
WITHIN THE PAST 12 MONTHS, YOU WORRIED THAT YOUR FOOD WOULD RUN OUT BEFORE YOU GOT THE MONEY TO BUY MORE: NEVER TRUE
IN A TYPICAL WEEK, HOW MANY TIMES DO YOU TALK ON THE PHONE WITH FAMILY, FRIENDS, OR NEIGHBORS?: ONCE A WEEK
IN A TYPICAL WEEK, HOW MANY TIMES DO YOU TALK ON THE PHONE WITH FAMILY, FRIENDS, OR NEIGHBORS?: ONCE A WEEK
HOW OFTEN DO YOU GET TOGETHER WITH FRIENDS OR RELATIVES?: ONCE A WEEK
HOW HARD IS IT FOR YOU TO PAY FOR THE VERY BASICS LIKE FOOD, HOUSING, MEDICAL CARE, AND HEATING?: NOT HARD AT ALL
DO YOU BELONG TO ANY CLUBS OR ORGANIZATIONS SUCH AS CHURCH GROUPS UNIONS, FRATERNAL OR ATHLETIC GROUPS, OR SCHOOL GROUPS?: YES
HOW OFTEN DO YOU HAVE SIX OR MORE DRINKS ON ONE OCCASION: LESS THAN MONTHLY
DO YOU BELONG TO ANY CLUBS OR ORGANIZATIONS SUCH AS CHURCH GROUPS UNIONS, FRATERNAL OR ATHLETIC GROUPS, OR SCHOOL GROUPS?: YES
HOW MANY DRINKS CONTAINING ALCOHOL DO YOU HAVE ON A TYPICAL DAY WHEN YOU ARE DRINKING: 1 OR 2
HOW OFTEN DO YOU ATTENT MEETINGS OF THE CLUB OR ORGANIZATION YOU BELONG TO?: MORE THAN 4 TIMES PER YEAR
HOW OFTEN DO YOU ATTEND CHURCH OR RELIGIOUS SERVICES?: NEVER

## 2022-07-25 ASSESSMENT — LIFESTYLE VARIABLES
HOW MANY STANDARD DRINKS CONTAINING ALCOHOL DO YOU HAVE ON A TYPICAL DAY: 1 OR 2
AUDIT-C TOTAL SCORE: 5
SKIP TO QUESTIONS 9-10: 0
HOW OFTEN DO YOU HAVE A DRINK CONTAINING ALCOHOL: 4 OR MORE TIMES A WEEK
HOW OFTEN DO YOU HAVE SIX OR MORE DRINKS ON ONE OCCASION: LESS THAN MONTHLY

## 2022-07-25 NOTE — LETTER
[facility]  Litzy Gastelum P.A.-C.  [address]  [place] [address]  Fax: [phone]   Authorization for Release/Disclosure of   Protected Health Information   Name: WILLIE SMITH : 1975 SSN: [id number]   Address: [address] [place] [id number] Phone:    [phone] (home)    I authorize the entity listed below to release/disclose the PHI below to:   [facility]/Litzy Gastelum P.A.-C. and Litzy Gastelum P.A.-C.   Provider or Entity Name:     Address   City, State, Zip   Phone:      Fax:     Reason for request: continuity of care   Information to be released:    [  ] LAST COLONOSCOPY,  including any PATH REPORT and follow-up  [  ] LAST FIT/COLOGUARD RESULT [  ] LAST DEXA  [  ] LAST MAMMOGRAM  [  ] LAST PAP  [  ] LAST LABS [  ] RETINA EXAM REPORT  [  ] IMMUNIZATION RECORDS  [  ] Release all info      [  ] Check here and initial the line next to each item to release ALL health information INCLUDING  _____ Care and treatment for drug and / or alcohol abuse  _____ HIV testing, infection status, or AIDS  _____ Genetic Testing    DATES OF SERVICE OR TIME PERIOD TO BE DISCLOSED: _____________  I understand and acknowledge that:  * This Authorization may be revoked at any time by you in writing, except if your health information has already been used or disclosed.  * Your health information that will be used or disclosed as a result of you signing this authorization could be re-disclosed by the recipient. If this occurs, your re-disclosed health information may no longer be protected by State or Federal laws.  * You may refuse to sign this Authorization. Your refusal will not affect your ability to obtain treatment.  * This Authorization becomes effective upon signing and will  on (date) __________.      If no date is indicated, this Authorization will  one (1) year from the signature date.    Name: Willie Smith    Signature:   Date:      7/25/2022       PLEASE FAX REQUESTED RECORDS BACK TO: [phone]

## 2022-07-25 NOTE — PROGRESS NOTES
Subjective:     CC:  Diagnoses of Primary insomnia, Attention deficit hyperactivity disorder (ADHD), unspecified ADHD type, and Hypotestosteronemia in male were pertinent to this visit.    HISTORY OF THE PRESENT ILLNESS: Patient is a 47 y.o. male. This pleasant patient is here today to establish care and discuss medication refills. His/her prior PCP was in [facility].    Recently moved back to [place] from [place]    ADHD   Patient is currently prescribed methylphenidate 36 mg SR.  Patient has been stable on this current dose for some time.  Denies medication side effects.  Denies episodes of appetite suppression, hypertension, headaches, palpitations.    primary insomnia  Chronic condition.  Patient currently takes Ambien 3.5 mg nightly as needed very sparingly for this.  He was previously referred to sleep medicine by his PCP in [facility] although this is of course lost to follow-up.  Patient is requesting updated referral today    Low testosterone  Chronic condition  Due for updated lab work  Patient is currently taking 0.5 mL q. 14 days.  Patient also requesting referral to urology for further evaluation and management and BPH symptoms    Mouth sores  Patient was previously treated for mouth sores with which he believes was a weeklong course of Diflucan.  Patient states that the mouth sores have returned.  Denies dysphagia. denies pain with chewing        Health Maintenance     - Dyslipidemia (30-45): Ordered today  - Diabetes (HTN, HLD, BMI >25): Ordered today  - Depression screening (PHQ-2 and/or PHQ-9): Negative  - Dental: Up-to-date  - Eye: Up-to-date  Exercise:  Sedentary  Substance Use:  Denies  Tobacco Use/counseling: Denies        Infectious disease screening/Immunizations  --Immunizations: No COVID vaccines on file      Current Outpatient Medications Ordered in Epic   Medication Sig Dispense Refill   • losartan (COZAAR) 100 MG Tab TAKE 1 TABLET BY MOUTH EVERY DAY 30 Tab 0   • buPROPion SR  "(WELLBUTRIN-SR) 150 MG TABLET SR 12 HR sustained-release tablet TAKE 1 TABLET BY MOUTH EVERY DAY 90 Tab 0   • benzonatate (TESSALON) 200 MG capsule Take 1 Cap by mouth 3 times a day as needed for Cough. 30 Cap 0   • Zolpidem Tartrate 3.5 MG SL Tab Place 1 Tab under tongue every bedtime.     • Methylphenidate HCl ER 36 MG TABLET SR 24 HR Take 1 Tab by mouth every day.     • fluoxetine (PROZAC) 40 MG capsule Take 40 mg by mouth every day.     • topiramate (TOPAMAX) 25 MG Tab Take 1 Tab by mouth every day. 90 Tab 1   • pantoprazole (PROTONIX) 40 MG Tablet Delayed Response Take 40 mg by mouth every day.     • Azelastine HCl (ASTEPRO) 0.15 % Solution Spray  in nose.       No current [facility]-ordered facility-administered medications on file.         ROS:   Gen: no fevers/chills, no changes in weight  Eyes: no changes in vision  ENT: no sore throat, no hearing loss, no bloody nose  Pulm: no sob, no cough  CV: no chest pain, no palpitations  GI: no nausea/vomiting, no diarrhea  : no dysuria  MSk: no myalgias  Skin: no rash  Neuro: no headaches, no numbness/tingling  Heme/Lymph: no easy bruising      Objective:       Exam: /70   Pulse 82   Temp 36.2 °C (97.1 °F)   Resp 16   Ht 1.88 m (6' 2\")   Wt 109 kg (240 lb)   SpO2 95%  Body mass index is 30.81 kg/m².    General: Normal appearing. No distress.  HEENT: Normocephalic. Eyes conjunctiva clear lids without ptosis, pupils equal and reactive to light accommodation, ears normal shape and contour, canals are clear bilaterally, tympanic membranes are benign, nasal mucosa benign, oropharynx is without erythema, edema or exudates.   Neck: Supple without JVD or bruit. Thyroid is not enlarged.  Pulmonary: Clear to ausculation.  Normal effort. No rales, ronchi, or wheezing.  Cardiovascular: Regular rate and rhythm without murmur. Carotid and radial pulses are intact and equal bilaterally.  Abdomen: Soft, nontender, nondistended. Normal bowel sounds. Liver and spleen are not " palpable  Neurologic: Grossly nonfocal  Lymph: No cervical or supraclavicular lymph nodes are palpable  Skin: Warm and dry.  No obvious lesions.  Musculoskeletal: Normal gait. No extremity cyanosis, clubbing, or edema.  Psych: Normal mood and affect. Alert and oriented x3. Judgment and insight is normal.      Assessment & Plan:   47 y.o. male with the following -    1. Primary insomnia  Chronic condition  Referral to sleep medicine for further evaluation and management  - Controlled Substance Treatment Agreement  - [facility] PAIN MANAGEMENT SCREEN; Future  - Zolpidem Tartrate 3.5 MG SL Tab; Place 1 Tablet under the tongue at bedtime for 30 days.  Dispense: 30 Tablet; Refill: 0  - Referral to Pulmonary and Sleep Medicine    2. Attention deficit hyperactivity disorder (ADHD), unspecified ADHD type  Chronic condition, well-controlled  continue continue current medication dosage  PDMP reviewed  CSA and UDS updated  - Controlled Substance Treatment Agreement  - [facility] PAIN MANAGEMENT SCREEN; Future  - Methylphenidate HCl ER 36 MG TABLET SR 24 HR; Take 1 Tablet by mouth every day for 30 days.  Dispense: 30 Tablet; Refill: 0    3. Hypotestosteronemia in male  Chronic condition   continue current medication dosage  PDMP reviewed  CSA and UDS updated  - Controlled Substance Treatment Agreement  - [facility] PAIN MANAGEMENT SCREEN; Future  - TSH WITH REFLEX TO FT4; Future  - PROSTATE SPECIFIC AG SCREENING; Future  - Testosterone, Free & Total, Adult Male (w/SHBG); Future  - Referral to Urology    4. Tinnitus of both ears  - Referral to ENT    5. Lipid screening  - CBC WITH DIFFERENTIAL; Future  - Comp Metabolic Panel; Future  - Lipid Profile; Future    6. Ulceration, oral mucosa  - CULTURE WOUND W/ GRAM STAIN; Future      I spent a total of 20 minutes with record review, exam, communication with the patient, communication with other providers, and documentation of this encounter.    No follow-ups on file.    Please note  that this dictation was created using voice recognition software. I have made every reasonable attempt to correct obvious errors, but I expect that there are errors of grammar and possibly content that I did not discover before finalizing the note.

## 2022-07-26 DIAGNOSIS — K12.1 ULCERATION, ORAL MUCOSA: ICD-10-CM

## 2022-07-26 LAB
ALBUMIN SERPL BCP-MCNC: 4.4 G/DL (ref 3.2–4.9)
ALBUMIN/GLOB SERPL: 1.7 G/DL
ALP SERPL-CCNC: 69 U/L (ref 30–99)
ALT SERPL-CCNC: 19 U/L (ref 2–50)
AMBIGUOUS DTTM AMBI4: NORMAL
ANION GAP SERPL CALC-SCNC: 9 MMOL/L (ref 7–16)
AST SERPL-CCNC: 26 U/L (ref 12–45)
BILIRUB SERPL-MCNC: 0.6 MG/DL (ref 0.1–1.5)
BUN SERPL-MCNC: 12 MG/DL (ref 8–22)
CALCIUM SERPL-MCNC: 9.1 MG/DL (ref 8.5–10.5)
CHLORIDE SERPL-SCNC: 103 MMOL/L (ref 96–112)
CHOLEST SERPL-MCNC: 171 MG/DL (ref 100–199)
CO2 SERPL-SCNC: 27 MMOL/L (ref 20–33)
CREAT SERPL-MCNC: 0.82 MG/DL (ref 0.5–1.4)
FASTING STATUS PATIENT QL REPORTED: NORMAL
GFR SERPLBLD CREATININE-BSD FMLA CKD-EPI: 109 ML/MIN/1.73 M 2
GLOBULIN SER CALC-MCNC: 2.6 G/DL (ref 1.9–3.5)
GLUCOSE SERPL-MCNC: 86 MG/DL (ref 65–99)
GRAM STN SPEC: NORMAL
HDLC SERPL-MCNC: 37 MG/DL
LDLC SERPL CALC-MCNC: 94 MG/DL
POTASSIUM SERPL-SCNC: 4.4 MMOL/L (ref 3.6–5.5)
PROT SERPL-MCNC: 7 G/DL (ref 6–8.2)
PSA SERPL-MCNC: 0.74 NG/ML (ref 0–4)
SIGNIFICANT IND 70042: NORMAL
SIGNIFICANT IND 70042: NORMAL
SITE SITE: NORMAL
SITE SITE: NORMAL
SODIUM SERPL-SCNC: 139 MMOL/L (ref 135–145)
SOURCE SOURCE: NORMAL
SOURCE SOURCE: NORMAL
TRIGL SERPL-MCNC: 198 MG/DL (ref 0–149)
TSH SERPL DL<=0.005 MIU/L-ACNC: 1.18 UIU/ML (ref 0.38–5.33)

## 2022-07-27 LAB
SHBG SERPL-SCNC: 15 NMOL/L (ref 17–56)
TESTOST FREE MFR SERPL: 2.8 % (ref 1.6–2.9)
TESTOST FREE SERPL-MCNC: 239 PG/ML (ref 47–244)
TESTOST SERPL-MCNC: 861 NG/DL (ref 300–890)

## 2022-07-28 LAB
BACTERIA WND AEROBE CULT: ABNORMAL
GRAM STN SPEC: ABNORMAL
SIGNIFICANT IND 70042: ABNORMAL
SITE SITE: ABNORMAL
SOURCE SOURCE: ABNORMAL

## 2022-07-29 DIAGNOSIS — K12.1 ULCERATION, ORAL MUCOSA: ICD-10-CM

## 2022-07-29 RX ORDER — CEPHALEXIN 500 MG/1
500 CAPSULE ORAL 4 TIMES DAILY
Qty: 20 CAPSULE | Refills: 0 | Status: SHIPPED | OUTPATIENT
Start: 2022-07-29 | End: 2022-10-17

## 2022-08-01 ENCOUNTER — HOSPITAL ENCOUNTER (OUTPATIENT)
Facility: MEDICAL CENTER | Age: 47
End: 2022-08-01
Attending: ORTHOPAEDIC SURGERY | Admitting: ORTHOPAEDIC SURGERY
Payer: COMMERCIAL

## 2022-08-01 DIAGNOSIS — G56.02 CARPAL TUNNEL SYNDROME ON LEFT: ICD-10-CM

## 2022-08-22 ENCOUNTER — OFFICE VISIT (OUTPATIENT)
Dept: MEDICAL GROUP | Facility: LAB | Age: 47
End: 2022-08-22
Payer: COMMERCIAL

## 2022-08-22 VITALS
SYSTOLIC BLOOD PRESSURE: 118 MMHG | HEIGHT: 75 IN | HEART RATE: 94 BPM | WEIGHT: 246 LBS | DIASTOLIC BLOOD PRESSURE: 72 MMHG | BODY MASS INDEX: 30.59 KG/M2 | TEMPERATURE: 97.2 F | OXYGEN SATURATION: 96 % | RESPIRATION RATE: 16 BRPM

## 2022-08-22 DIAGNOSIS — K13.79 MOUTH SORES: ICD-10-CM

## 2022-08-22 DIAGNOSIS — F33.0 MILD EPISODE OF RECURRENT MAJOR DEPRESSIVE DISORDER (HCC): ICD-10-CM

## 2022-08-22 DIAGNOSIS — E29.1 HYPOTESTOSTERONEMIA IN MALE: ICD-10-CM

## 2022-08-22 PROCEDURE — 99214 OFFICE O/P EST MOD 30 MIN: CPT | Performed by: PHYSICIAN ASSISTANT

## 2022-08-22 RX ORDER — FLUOXETINE HYDROCHLORIDE 40 MG/1
40 CAPSULE ORAL DAILY
Qty: 90 CAPSULE | Refills: 3 | Status: SHIPPED | OUTPATIENT
Start: 2022-08-22 | End: 2022-10-27 | Stop reason: SDUPTHER

## 2022-08-22 ASSESSMENT — FIBROSIS 4 INDEX: FIB4 SCORE: 0.99

## 2022-08-22 NOTE — PROGRESS NOTES
"Subjective:     CC: 1 mo f/u    HPI:   Willie here today with     Mouth Sores  Improved with Keflex, seems \"to be different\" now. Reports new lesions on sides of tongue. Pt has been applying clotrimazole 1% lotion and using troches with improvement in current symptoms. ENT referral pending    Testosterone dosed every week currently. Borderline elevated on most recent lab work. Will trial Q 2 week dosing    Triglycerides elevated, pt reports he eats approx 5lbs of tortilla chips per week. Plans to cut back on this.     ROS:  Gen: no fevers/chills, no changes in weight  Eyes: no changes in vision  ENT: no sore throat, no hearing loss, no bloody nose  Pulm: no sob, no cough  CV: no chest pain, no palpitations  GI: no nausea/vomiting, no diarrhea  : no dysuria  MSk: no myalgias  Skin: no rash  Neuro: no headaches, no numbness/tingling  Heme/Lymph: no easy bruising    Current Outpatient Medications Ordered in Epic   Medication Sig Dispense Refill    losartan (COZAAR) 100 MG Tab Take 1 Tablet by mouth every day.      CONCERTA 54 MG CR tablet Take 54 mg by mouth every morning.      Methylphenidate HCl ER 54 MG TABLET SR 24 HR Take 1 Tablet by mouth every morning.      Methylphenidate HCl ER 36 MG TABLET SR 24 HR Take 36 mg by mouth.      testosterone cypionate (DEPO-TESTOSTERONE) 200 MG/ML Solution injection Inject 150 mg into the shoulder, thigh, or buttocks.      topiramate (TOPAMAX) 100 MG Tab Take 100 mg by mouth.      topiramate (TOPAMAX) 25 MG Tab Take  by mouth.      Zolpidem Tartrate 3.5 MG SL Tab Take 3.5 mg by mouth.      buPROPion (WELLBUTRIN XL) 300 MG XL tablet Take 1 Tablet by mouth every morning.      tamsulosin (FLOMAX) 0.4 MG capsule Take 0.4 mg by mouth 1/2 hour after breakfast.      topiramate (TOPAMAX) 50 MG tablet TAKE 1 TABLET BY MOUTH DAILY AT BEDTIME      hydroCHLOROthiazide (HYDRODIURIL) 12.5 MG tablet Take 12.5 mg by mouth every day.      naproxen (NAPROSYN) 500 MG Tab Take 500 mg by mouth 2 " "times a day with meals.      sildenafil citrate (VIAGRA) 50 MG tablet Take 50 mg by mouth 1 time a day as needed.      testosterone cypionate (DEPO-TESTOSTERONE) 200 MG/ML Solution injection INJECT 0.75ML INTO THE MUSCLE EVERY 14 DAYS **SINGLE USE VIAL. DISCARD AFTER FIRST PUNCTURE**      Zolpidem Tartrate 3.5 MG SL Tab Place 1 Tablet under the tongue at bedtime for 30 days. 30 Tablet 0    Methylphenidate HCl ER 36 MG TABLET SR 24 HR Take 1 Tablet by mouth every day for 30 days. 30 Tablet 0    losartan (COZAAR) 100 MG Tab TAKE 1 TABLET BY MOUTH EVERY DAY 30 Tab 0    fluoxetine (PROZAC) 40 MG capsule Take 40 mg by mouth every day.      pantoprazole (PROTONIX) 40 MG Tablet Delayed Response Take 40 mg by mouth every day.      Azelastine HCl (ASTEPRO) 0.15 % Solution Spray  in nose.       No current [facility]-ordered facility-administered medications on file.       Objective:     Exam:  /72   Pulse 94   Temp 36.2 °C (97.2 °F)   Resp 16   Ht 1.905 m (6' 3\")   Wt 112 kg (246 lb)   SpO2 96%   BMI 30.75 kg/m²  Body mass index is 30.75 kg/m².    Gen: Alert and oriented, No apparent distress.  Tongue: Faded white ringed lesions on sides of tongue, no ulceration, no sloughing  Neck: Neck is supple without lymphadenopathy.  Lungs: Normal effort, CTA bilaterally, no wheezes, rhonchi, or rales  CV: Regular rate and rhythm. No murmurs, rubs, or gallops.  Ext: No clubbing, cyanosis, edema.    Assessment & Plan:     47 y.o. male with the following -     1. Hypotestosteronemia in male  Chronic condition  Borderline high, plan to recheck after holding dose for 2 weeks  - Testosterone, Free & Total, Adult Male (w/SHBG); Future    2. Mild episode of recurrent major depressive disorder (HCC)  Chronic condition, stable  - fluoxetine (PROZAC) 40 MG capsule; Take 1 Capsule by mouth every day.  Dispense: 90 Capsule; Refill: 3     3. Mouth Sores  Initial lesions appear improved with Keflex, now lesions on sides of tongue which appear " to respond to antifungals. Advised pt to continue this medication for now and f/u with ENT    I spent a total of 20 minutes with record review, exam, communication with the patient, communication with other providers, and documentation of this encounter.      No follow-ups on file.    Please note that this dictation was created using voice recognition software. I have made every reasonable attempt to correct obvious errors, but there may be errors of grammar and possibly content that I did not discover before finalizing the note.

## 2022-08-25 ENCOUNTER — TELEPHONE (OUTPATIENT)
Dept: MEDICAL GROUP | Facility: LAB | Age: 47
End: 2022-08-25
Payer: COMMERCIAL

## 2022-08-25 NOTE — TELEPHONE ENCOUNTER
Drug  Zolpidem Tartrate 3.5MG sublingual tablets    Form  [employer] Electronic PA Form (2017 NCPDP)    Key: [id number]

## 2022-08-30 DIAGNOSIS — F98.8 ADD (ATTENTION DEFICIT DISORDER) WITHOUT HYPERACTIVITY: ICD-10-CM

## 2022-08-30 RX ORDER — METHYLPHENIDATE HYDROCHLORIDE 54 MG/1
54 TABLET, EXTENDED RELEASE ORAL EVERY MORNING
Qty: 30 TABLET | Refills: 0 | Status: SHIPPED | OUTPATIENT
Start: 2022-08-30 | End: 2022-08-31

## 2022-08-30 NOTE — TELEPHONE ENCOUNTER
Received request via: Patient    Was the patient seen in the last year in this department? Yes    Does the patient have an active prescription (recently filled or refills available) for medication(s) requested? No    Patient out of town for work. He requested for refill prescription to get sent to [address] in [place]

## 2022-08-31 DIAGNOSIS — F98.8 ADD (ATTENTION DEFICIT DISORDER) WITHOUT HYPERACTIVITY: ICD-10-CM

## 2022-08-31 RX ORDER — METHYLPHENIDATE HYDROCHLORIDE 54 MG/1
54 TABLET ORAL EVERY MORNING
Qty: 30 TABLET | Refills: 0 | Status: SHIPPED | OUTPATIENT
Start: 2022-08-31 | End: 2022-09-30

## 2022-08-31 NOTE — TELEPHONE ENCOUNTER
Received a fax from [facility] stating not able fill the prescription. Please re send to [facility] in [place].

## 2022-09-28 DIAGNOSIS — J30.2 SEASONAL ALLERGIES: ICD-10-CM

## 2022-09-28 RX ORDER — AZELASTINE HCL 205.5 UG/1
SPRAY NASAL
Qty: 11 ML | Refills: 0 | Status: SHIPPED | OUTPATIENT
Start: 2022-09-28 | End: 2022-11-28

## 2022-09-29 DIAGNOSIS — R79.89 LOW TESTOSTERONE: ICD-10-CM

## 2022-09-30 RX ORDER — TESTOSTERONE CYPIONATE 200 MG/ML
INJECTION, SOLUTION INTRAMUSCULAR
Qty: 1.96 ML | Refills: 0 | Status: SHIPPED | OUTPATIENT
Start: 2022-09-30 | End: 2022-10-31 | Stop reason: SDUPTHER

## 2022-10-03 ENCOUNTER — TELEPHONE (OUTPATIENT)
Dept: MEDICAL GROUP | Facility: LAB | Age: 47
End: 2022-10-03
Payer: COMMERCIAL

## 2022-10-03 NOTE — TELEPHONE ENCOUNTER
Drug  Zolpidem Tartrate 3.5MG sublingual tablets  Form  [employer] Electronic PA Form (2017 NCPDP)   Key: [id number] - Rx #: [id number]

## 2022-10-17 DIAGNOSIS — K12.1 ULCERATION, ORAL MUCOSA: ICD-10-CM

## 2022-10-17 RX ORDER — CEPHALEXIN 500 MG/1
500 CAPSULE ORAL 4 TIMES DAILY
Qty: 20 CAPSULE | Refills: 0 | Status: SHIPPED | OUTPATIENT
Start: 2022-10-17 | End: 2022-10-22

## 2022-10-27 DIAGNOSIS — F33.0 MILD EPISODE OF RECURRENT MAJOR DEPRESSIVE DISORDER (HCC): ICD-10-CM

## 2022-10-27 DIAGNOSIS — I10 ESSENTIAL HYPERTENSION: ICD-10-CM

## 2022-10-27 DIAGNOSIS — R79.89 LOW TESTOSTERONE: ICD-10-CM

## 2022-10-31 ENCOUNTER — HOSPITAL ENCOUNTER (OUTPATIENT)
Dept: LAB | Facility: MEDICAL CENTER | Age: 47
End: 2022-10-31
Attending: PHYSICIAN ASSISTANT
Payer: COMMERCIAL

## 2022-10-31 DIAGNOSIS — E29.1 HYPOTESTOSTERONEMIA IN MALE: ICD-10-CM

## 2022-10-31 DIAGNOSIS — R79.89 LOW TESTOSTERONE: ICD-10-CM

## 2022-10-31 PROCEDURE — 84403 ASSAY OF TOTAL TESTOSTERONE: CPT

## 2022-10-31 PROCEDURE — 36415 COLL VENOUS BLD VENIPUNCTURE: CPT

## 2022-10-31 PROCEDURE — 84270 ASSAY OF SEX HORMONE GLOBUL: CPT

## 2022-10-31 PROCEDURE — 84402 ASSAY OF FREE TESTOSTERONE: CPT

## 2022-10-31 RX ORDER — TESTOSTERONE CYPIONATE 200 MG/ML
INJECTION, SOLUTION INTRAMUSCULAR
Qty: 1.96 ML | Refills: 0 | OUTPATIENT
Start: 2022-10-31 | End: 2022-11-26

## 2022-10-31 RX ORDER — TESTOSTERONE CYPIONATE 200 MG/ML
INJECTION, SOLUTION INTRAMUSCULAR
Qty: 1.96 ML | Refills: 0 | Status: SHIPPED | OUTPATIENT
Start: 2022-10-31 | End: 2022-11-01 | Stop reason: SDUPTHER

## 2022-10-31 RX ORDER — LOSARTAN POTASSIUM 100 MG/1
100 TABLET ORAL
Qty: 90 TABLET | Refills: 3 | Status: SHIPPED | OUTPATIENT
Start: 2022-10-31 | End: 2023-08-31 | Stop reason: SDUPTHER

## 2022-10-31 RX ORDER — FLUOXETINE HYDROCHLORIDE 40 MG/1
40 CAPSULE ORAL DAILY
Qty: 90 CAPSULE | Refills: 3 | Status: SHIPPED | OUTPATIENT
Start: 2022-10-31 | End: 2023-08-31 | Stop reason: SDUPTHER

## 2022-11-01 DIAGNOSIS — R79.89 LOW TESTOSTERONE: ICD-10-CM

## 2022-11-01 NOTE — TELEPHONE ENCOUNTER
Received request via: Pharmacy    Was the patient seen in the last year in this department? Yes    Does the patient have an active prescription (recently filled or refills available) for medication(s) requested? Yes. [facility] wont fill RX needs it sent to another pharmacy while he's out of town.

## 2022-11-02 LAB
SHBG SERPL-SCNC: 15 NMOL/L (ref 17–56)
TESTOST FREE MFR SERPL: 2.5 % (ref 1.6–2.9)
TESTOST FREE SERPL-MCNC: 79 PG/ML (ref 47–244)
TESTOST SERPL-MCNC: 314 NG/DL (ref 300–890)

## 2022-11-02 RX ORDER — TESTOSTERONE CYPIONATE 200 MG/ML
INJECTION, SOLUTION INTRAMUSCULAR
Qty: 1.96 ML | Refills: 0 | Status: SHIPPED | OUTPATIENT
Start: 2022-11-02 | End: 2022-12-01

## 2022-11-27 DIAGNOSIS — J30.2 SEASONAL ALLERGIES: ICD-10-CM

## 2022-11-28 RX ORDER — AZELASTINE HCL 205.5 UG/1
SPRAY NASAL
Qty: 4 ML | Refills: 0 | Status: SHIPPED | OUTPATIENT
Start: 2022-11-28 | End: 2023-05-12

## 2022-12-06 ENCOUNTER — PRE-ADMISSION TESTING (OUTPATIENT)
Dept: ADMISSIONS | Facility: MEDICAL CENTER | Age: 47
End: 2022-12-06
Attending: ORTHOPAEDIC SURGERY
Payer: COMMERCIAL

## 2022-12-06 VITALS — HEIGHT: 74 IN | BODY MASS INDEX: 30.8 KG/M2 | WEIGHT: 240 LBS

## 2022-12-06 ASSESSMENT — FIBROSIS 4 INDEX: FIB4 SCORE: 0.99

## 2022-12-08 ENCOUNTER — ANESTHESIA (OUTPATIENT)
Dept: SURGERY | Facility: MEDICAL CENTER | Age: 47
End: 2022-12-08
Payer: COMMERCIAL

## 2022-12-08 ENCOUNTER — ANESTHESIA EVENT (OUTPATIENT)
Dept: SURGERY | Facility: MEDICAL CENTER | Age: 47
End: 2022-12-08
Payer: COMMERCIAL

## 2022-12-08 ENCOUNTER — HOSPITAL ENCOUNTER (OUTPATIENT)
Facility: MEDICAL CENTER | Age: 47
End: 2022-12-08
Attending: ORTHOPAEDIC SURGERY | Admitting: ORTHOPAEDIC SURGERY
Payer: COMMERCIAL

## 2022-12-08 VITALS
TEMPERATURE: 97 F | SYSTOLIC BLOOD PRESSURE: 129 MMHG | OXYGEN SATURATION: 95 % | BODY MASS INDEX: 32.54 KG/M2 | WEIGHT: 253.53 LBS | RESPIRATION RATE: 16 BRPM | HEIGHT: 74 IN | HEART RATE: 70 BPM | DIASTOLIC BLOOD PRESSURE: 85 MMHG

## 2022-12-08 DIAGNOSIS — G56.02 CARPAL TUNNEL SYNDROME ON LEFT: ICD-10-CM

## 2022-12-08 DIAGNOSIS — R79.89 LOW TESTOSTERONE: ICD-10-CM

## 2022-12-08 LAB
ANION GAP SERPL CALC-SCNC: 10 MMOL/L (ref 7–16)
BUN SERPL-MCNC: 15 MG/DL (ref 8–22)
CALCIUM SERPL-MCNC: 9.2 MG/DL (ref 8.5–10.5)
CHLORIDE SERPL-SCNC: 104 MMOL/L (ref 96–112)
CO2 SERPL-SCNC: 25 MMOL/L (ref 20–33)
CREAT SERPL-MCNC: 0.69 MG/DL (ref 0.5–1.4)
GFR SERPLBLD CREATININE-BSD FMLA CKD-EPI: 114 ML/MIN/1.73 M 2
GLUCOSE SERPL-MCNC: 103 MG/DL (ref 65–99)
POTASSIUM SERPL-SCNC: 4.4 MMOL/L (ref 3.6–5.5)
SODIUM SERPL-SCNC: 139 MMOL/L (ref 135–145)

## 2022-12-08 PROCEDURE — 64721 CARPAL TUNNEL SURGERY: CPT | Mod: LT | Performed by: ORTHOPAEDIC SURGERY

## 2022-12-08 PROCEDURE — 700101 HCHG RX REV CODE 250: Performed by: ANESTHESIOLOGY

## 2022-12-08 PROCEDURE — 700111 HCHG RX REV CODE 636 W/ 250 OVERRIDE (IP): Performed by: ANESTHESIOLOGY

## 2022-12-08 PROCEDURE — 160035 HCHG PACU - 1ST 60 MINS PHASE I: Performed by: ORTHOPAEDIC SURGERY

## 2022-12-08 PROCEDURE — 160009 HCHG ANES TIME/MIN: Performed by: ORTHOPAEDIC SURGERY

## 2022-12-08 PROCEDURE — 700111 HCHG RX REV CODE 636 W/ 250 OVERRIDE (IP): Performed by: ORTHOPAEDIC SURGERY

## 2022-12-08 PROCEDURE — 160048 HCHG OR STATISTICAL LEVEL 1-5: Performed by: ORTHOPAEDIC SURGERY

## 2022-12-08 PROCEDURE — 160046 HCHG PACU - 1ST 60 MINS PHASE II: Performed by: ORTHOPAEDIC SURGERY

## 2022-12-08 PROCEDURE — 700105 HCHG RX REV CODE 258: Performed by: ORTHOPAEDIC SURGERY

## 2022-12-08 PROCEDURE — 160028 HCHG SURGERY MINUTES - 1ST 30 MINS LEVEL 3: Performed by: ORTHOPAEDIC SURGERY

## 2022-12-08 PROCEDURE — 80048 BASIC METABOLIC PNL TOTAL CA: CPT

## 2022-12-08 PROCEDURE — 160025 RECOVERY II MINUTES (STATS): Performed by: ORTHOPAEDIC SURGERY

## 2022-12-08 PROCEDURE — 700101 HCHG RX REV CODE 250: Performed by: ORTHOPAEDIC SURGERY

## 2022-12-08 PROCEDURE — 01810 ANES PX NRV MUSC F/ARM WRST: CPT | Performed by: ANESTHESIOLOGY

## 2022-12-08 PROCEDURE — 160002 HCHG RECOVERY MINUTES (STAT): Performed by: ORTHOPAEDIC SURGERY

## 2022-12-08 PROCEDURE — 36415 COLL VENOUS BLD VENIPUNCTURE: CPT

## 2022-12-08 RX ORDER — HYDROMORPHONE HYDROCHLORIDE 1 MG/ML
0.4 INJECTION, SOLUTION INTRAMUSCULAR; INTRAVENOUS; SUBCUTANEOUS
Status: DISCONTINUED | OUTPATIENT
Start: 2022-12-08 | End: 2022-12-08 | Stop reason: HOSPADM

## 2022-12-08 RX ORDER — KETOROLAC TROMETHAMINE 30 MG/ML
INJECTION, SOLUTION INTRAMUSCULAR; INTRAVENOUS PRN
Status: DISCONTINUED | OUTPATIENT
Start: 2022-12-08 | End: 2022-12-08 | Stop reason: SURG

## 2022-12-08 RX ORDER — HYDROMORPHONE HYDROCHLORIDE 1 MG/ML
0.1 INJECTION, SOLUTION INTRAMUSCULAR; INTRAVENOUS; SUBCUTANEOUS
Status: DISCONTINUED | OUTPATIENT
Start: 2022-12-08 | End: 2022-12-08 | Stop reason: HOSPADM

## 2022-12-08 RX ORDER — CEFAZOLIN SODIUM 1 G/3ML
2 INJECTION, POWDER, FOR SOLUTION INTRAMUSCULAR; INTRAVENOUS ONCE
Status: COMPLETED | OUTPATIENT
Start: 2022-12-08 | End: 2022-12-08

## 2022-12-08 RX ORDER — OXYCODONE HYDROCHLORIDE AND ACETAMINOPHEN 5; 325 MG/1; MG/1
2 TABLET ORAL
Status: DISCONTINUED | OUTPATIENT
Start: 2022-12-08 | End: 2022-12-08 | Stop reason: HOSPADM

## 2022-12-08 RX ORDER — HYDROMORPHONE HYDROCHLORIDE 1 MG/ML
0.2 INJECTION, SOLUTION INTRAMUSCULAR; INTRAVENOUS; SUBCUTANEOUS
Status: DISCONTINUED | OUTPATIENT
Start: 2022-12-08 | End: 2022-12-08 | Stop reason: HOSPADM

## 2022-12-08 RX ORDER — HALOPERIDOL 5 MG/ML
1 INJECTION INTRAMUSCULAR
Status: DISCONTINUED | OUTPATIENT
Start: 2022-12-08 | End: 2022-12-08 | Stop reason: HOSPADM

## 2022-12-08 RX ORDER — OXYCODONE HYDROCHLORIDE AND ACETAMINOPHEN 5; 325 MG/1; MG/1
1 TABLET ORAL
Status: DISCONTINUED | OUTPATIENT
Start: 2022-12-08 | End: 2022-12-08 | Stop reason: HOSPADM

## 2022-12-08 RX ORDER — LIDOCAINE HYDROCHLORIDE 10 MG/ML
INJECTION, SOLUTION INFILTRATION; PERINEURAL
Status: DISCONTINUED | OUTPATIENT
Start: 2022-12-08 | End: 2022-12-08 | Stop reason: HOSPADM

## 2022-12-08 RX ORDER — BUPIVACAINE HYDROCHLORIDE 5 MG/ML
INJECTION, SOLUTION EPIDURAL; INTRACAUDAL
Status: DISCONTINUED | OUTPATIENT
Start: 2022-12-08 | End: 2022-12-08 | Stop reason: HOSPADM

## 2022-12-08 RX ORDER — DEXAMETHASONE SODIUM PHOSPHATE 4 MG/ML
INJECTION, SOLUTION INTRA-ARTICULAR; INTRALESIONAL; INTRAMUSCULAR; INTRAVENOUS; SOFT TISSUE PRN
Status: DISCONTINUED | OUTPATIENT
Start: 2022-12-08 | End: 2022-12-08 | Stop reason: SURG

## 2022-12-08 RX ORDER — SODIUM CHLORIDE, SODIUM LACTATE, POTASSIUM CHLORIDE, CALCIUM CHLORIDE 600; 310; 30; 20 MG/100ML; MG/100ML; MG/100ML; MG/100ML
INJECTION, SOLUTION INTRAVENOUS CONTINUOUS
Status: DISCONTINUED | OUTPATIENT
Start: 2022-12-08 | End: 2022-12-08 | Stop reason: HOSPADM

## 2022-12-08 RX ORDER — DIPHENHYDRAMINE HYDROCHLORIDE 50 MG/ML
12.5 INJECTION INTRAMUSCULAR; INTRAVENOUS
Status: DISCONTINUED | OUTPATIENT
Start: 2022-12-08 | End: 2022-12-08 | Stop reason: HOSPADM

## 2022-12-08 RX ORDER — ONDANSETRON 2 MG/ML
4 INJECTION INTRAMUSCULAR; INTRAVENOUS
Status: DISCONTINUED | OUTPATIENT
Start: 2022-12-08 | End: 2022-12-08 | Stop reason: HOSPADM

## 2022-12-08 RX ORDER — MIDAZOLAM HYDROCHLORIDE 1 MG/ML
INJECTION INTRAMUSCULAR; INTRAVENOUS PRN
Status: DISCONTINUED | OUTPATIENT
Start: 2022-12-08 | End: 2022-12-08 | Stop reason: SURG

## 2022-12-08 RX ORDER — LIDOCAINE HYDROCHLORIDE 20 MG/ML
INJECTION, SOLUTION EPIDURAL; INFILTRATION; INTRACAUDAL; PERINEURAL PRN
Status: DISCONTINUED | OUTPATIENT
Start: 2022-12-08 | End: 2022-12-08 | Stop reason: SURG

## 2022-12-08 RX ORDER — MEPERIDINE HYDROCHLORIDE 25 MG/ML
12.5 INJECTION INTRAMUSCULAR; INTRAVENOUS; SUBCUTANEOUS
Status: DISCONTINUED | OUTPATIENT
Start: 2022-12-08 | End: 2022-12-08 | Stop reason: HOSPADM

## 2022-12-08 RX ORDER — IPRATROPIUM BROMIDE AND ALBUTEROL SULFATE 2.5; .5 MG/3ML; MG/3ML
3 SOLUTION RESPIRATORY (INHALATION)
Status: DISCONTINUED | OUTPATIENT
Start: 2022-12-08 | End: 2022-12-08 | Stop reason: HOSPADM

## 2022-12-08 RX ADMIN — DEXAMETHASONE SODIUM PHOSPHATE 8 MG: 4 INJECTION, SOLUTION INTRA-ARTICULAR; INTRALESIONAL; INTRAMUSCULAR; INTRAVENOUS; SOFT TISSUE at 09:21

## 2022-12-08 RX ADMIN — CEFAZOLIN 2 G: 330 INJECTION, POWDER, FOR SOLUTION INTRAMUSCULAR; INTRAVENOUS at 09:21

## 2022-12-08 RX ADMIN — LIDOCAINE HYDROCHLORIDE 100 MG: 20 INJECTION, SOLUTION EPIDURAL; INFILTRATION; INTRACAUDAL at 09:21

## 2022-12-08 RX ADMIN — KETOROLAC TROMETHAMINE 30 MG: 30 INJECTION, SOLUTION INTRAMUSCULAR at 09:21

## 2022-12-08 RX ADMIN — MIDAZOLAM HYDROCHLORIDE 2 MG: 1 INJECTION, SOLUTION INTRAMUSCULAR; INTRAVENOUS at 09:18

## 2022-12-08 RX ADMIN — FENTANYL CITRATE 50 MCG: 50 INJECTION, SOLUTION INTRAMUSCULAR; INTRAVENOUS at 09:18

## 2022-12-08 RX ADMIN — SODIUM CHLORIDE, POTASSIUM CHLORIDE, SODIUM LACTATE AND CALCIUM CHLORIDE: 600; 310; 30; 20 INJECTION, SOLUTION INTRAVENOUS at 09:15

## 2022-12-08 RX ADMIN — PROPOFOL 100 MG: 10 INJECTION, EMULSION INTRAVENOUS at 09:21

## 2022-12-08 ASSESSMENT — FIBROSIS 4 INDEX: FIB4 SCORE: 0.99

## 2022-12-08 ASSESSMENT — PAIN SCALES - GENERAL: PAIN_LEVEL: 0

## 2022-12-08 ASSESSMENT — PAIN DESCRIPTION - PAIN TYPE: TYPE: SURGICAL PAIN

## 2022-12-08 NOTE — LETTER
December 1, 2022    Patient Name: Willie Smith  Surgeon Name: Jim Crawley M.D.  Surgery Facility: [facility] ([address] [facility])  Surgery Date: 12/8/2022    The time of your surgery is not final and may change up to and until the day of your surgery. You will be contacted 24-48 hours prior to your surgery date with your check-in and surgery time.    If you will not be at one of the below numbers please call the surgery scheduler at [phone]  Preferred Phone: [phone]    BEFORE YOUR SURGERY   Pre Registration and/or Lab Work must be done within and no earlier than 28 days prior to your surgery date. Please call [facility] at [phone] for an appointment as soon as possible.    Instructions: Bring a list of all medications you are taking including the dosing and frequency.    DAY OF YOUR SURGERY  Nothing to eat or drink after midnight     Refrain from smoking any substance after midnight prior to surgery. Smoking may interfere with the anesthetic and frequently produces nausea during the recovery period.    Continue taking all lifesaving medications. Including the morning of your surgery with small sip of water.    Please do NOT take on the day of surgery:  Diuretics: examples- furosemide (Lasix), spironolactone, hydrochlorothiazide  ACE-inhibitors: examples- lisinopril, ramipril, enalapril  “ARBs”: examples- losartan, Olmesartan, valsartan    Please arrive at the hospital/surgery center at the check-in time provided.     An adult will need to bring you and take you home after your surgery.     AFTER YOUR SURGERY  Post op Appointment:   Date: 12/19   Time: 11:15AM   With: Jim Crawley M.D.   Location: [address] [place] [address]      TIME OFF WORK  FMLA or Disability forms can be faxed directly to: [phone] or you may drop them off at [address] [place] [address]. Our office charges a $35.00 fee per form. Forms will be completed  within 10 business days of drop off and payment received. For the status of your forms you may contact our disability office directly [phone].    MEDICATION INSTRUCTIONS **Please read section completely**    The following medications should be stopped a minimum of 10 days prior to surgery:  All over the counter, Supplements & Herbal medications    Anorectics: Phentermine (Adipex-P, Lomaira and Suprenza), Phentermine-topiramate (Qsymia), Bupropion-naltrexone (Contrave)    Opiod Partial Agonists/Opioid Antagonists: Buprenorphine (Subocone, Belbuca, Butrans, Probuphine Implant, Sublocade), Naltrexone (ReVia, Vivitrol), Naloxone    Amphetamines: Dextroamphetamine/Amphetamine (Adderall, Mydayis), Methylphenidate Hydrochloride (Concerta, Metadate, Methylin, Ritalin)    The following medications should be stopped 5 days prior to surgery:  Blood Thinners: Any Aspirin, Aspirin products, anti-inflammatories such as ibuprofen and any blood thinners such as Coumadin and Plavix. Please consult your prescribing physician if you are on life saving blood thinners, in regards to when to stop medications prior to surgery.     The following medications should be stopped a minimum of 3 days prior to surgery:  PDE-5 inhibitors: Sildenafil (Viagra), Tadalafil (Cialis), Vardenafil (Levitra), Avanafil (Stendra)    MAO Inhibitors: Rasagiline (Azilect), Selegiline (Eldepryl, Emsam, Selapar), Isocarboxazid (Marplan), Phenelzine (Nardil)

## 2022-12-08 NOTE — OR NURSING
0940- Pt arrived to PACU, report received.  Pt on 8L mask.  L wrist bandaged and CDI.  Fingers pink and warm with cap refill < 2 sec.     9850- Dr. Crawley contacted about pts wish to change pharmacy location.  Dr. Crawley will call rx in to [facility].      1000- Report given to phase 2.  Pt transferred over.

## 2022-12-08 NOTE — H&P
Chief Complaint   Patient presents with    Right Wrist - Numbness    Left Wrist - Numbness               History: Skinny Smith is a pleasant 47 y.o. male coming in today for evaluation of numbness and tingling in bilateral hands.  The symptoms have been present for a long time.  Symptoms began without any specific trauma or incident, and have been progressively worsening.  They bother him during the day and night.  They wake him up at night.  The symptoms are quite bothersome.  The symptoms are intermittent. They affect the radial digits.  Denies neck pain or shooting pain/numbness down the arm.           PMH:        Past Medical History:   Diagnosis Date    ADD (attention deficit disorder)      Depression      GERD (gastroesophageal reflux disease)      Hypertension      Tinnitus           PSH:         Past Surgical History:   Procedure Laterality Date    KNEE ARTHROSCOPY Left 8/10/2015     Procedure: KNEE ARTHROSCOPIC Meniscal Excision, Abrasion Arthroplasty, Synovectomy;  Surgeon: Kendall Bob III, M.D.;  Location: SURGERY [facility];  Service:     KNEE ARTHROSCOPY   2014      left knee performed by Dr Bob    [place] SINUS SURGERY PROC UNLISTED   age 11    ZZZ EXCISION LESION BENIGN         from neck         Tobacco history:   Social History           Tobacco Use   Smoking Status Former Smoker    Packs/day: 0.00    Years: 3.00    Pack years: 0.00    Quit date: 2005    Years since quittin.0   Smokeless Tobacco Former User    Types: Chew   Tobacco Comment     1-2         Medications:   Current Outpatient Medications:     losartan (COZAAR) 100 MG Tab, Take 1 Tablet by mouth every day., Disp: , Rfl:     Methylphenidate HCl ER 54 MG TABLET SR 24 HR, Take 1 Tablet by mouth every morning., Disp: , Rfl:     testosterone cypionate (DEPO-TESTOSTERONE) 200 MG/ML Solution injection, Inject 150 mg into the shoulder, thigh, or buttocks., Disp: , Rfl:     Zolpidem Tartrate 3.5 MG SL Tab, Take 3.5 mg by  mouth., Disp: , Rfl:     CONCERTA 54 MG CR tablet, Take 54 mg by mouth every morning., Disp: , Rfl:     Methylphenidate HCl ER 36 MG TABLET SR 24 HR, Take 36 mg by mouth., Disp: , Rfl:     topiramate (TOPAMAX) 100 MG Tab, Take 100 mg by mouth., Disp: , Rfl:     topiramate (TOPAMAX) 25 MG Tab, Take  by mouth., Disp: , Rfl:     cephALEXin (KEFLEX) 500 MG Cap, Take 1 Capsule by mouth 4 times a day for 5 days., Disp: 20 Capsule, Rfl: 0    buPROPion (WELLBUTRIN XL) 300 MG XL tablet, Take 1 Tablet by mouth every morning., Disp: , Rfl:     tamsulosin (FLOMAX) 0.4 MG capsule, Take 0.4 mg by mouth 1/2 hour after breakfast., Disp: , Rfl:     topiramate (TOPAMAX) 50 MG tablet, TAKE 1 TABLET BY MOUTH DAILY AT BEDTIME, Disp: , Rfl:     hydroCHLOROthiazide (HYDRODIURIL) 12.5 MG tablet, Take 12.5 mg by mouth every day., Disp: , Rfl:     naproxen (NAPROSYN) 500 MG Tab, Take 500 mg by mouth 2 times a day with meals., Disp: , Rfl:     sildenafil citrate (VIAGRA) 50 MG tablet, Take 50 mg by mouth 1 time a day as needed., Disp: , Rfl:     testosterone cypionate (DEPO-TESTOSTERONE) 200 MG/ML Solution injection, INJECT 0.75ML INTO THE MUSCLE EVERY 14 DAYS **SINGLE USE VIAL. DISCARD AFTER FIRST PUNCTURE**, Disp: , Rfl:     Zolpidem Tartrate 3.5 MG SL Tab, Place 1 Tablet under the tongue at bedtime for 30 days., Disp: 30 Tablet, Rfl: 0    Methylphenidate HCl ER 36 MG TABLET SR 24 HR, Take 1 Tablet by mouth every day for 30 days., Disp: 30 Tablet, Rfl: 0    losartan (COZAAR) 100 MG Tab, TAKE 1 TABLET BY MOUTH EVERY DAY, Disp: 30 Tab, Rfl: 0    fluoxetine (PROZAC) 40 MG capsule, Take 40 mg by mouth every day., Disp: , Rfl:     pantoprazole (PROTONIX) 40 MG Tablet Delayed Response, Take 40 mg by mouth every day., Disp: , Rfl:     Azelastine HCl (ASTEPRO) 0.15 % Solution, Spray  in nose., Disp: , Rfl:      Allergies: Latex, Avocado, Banana, and Food     Exam:  General: No acute distress, alert and oriented  Upper extremity: Bilateral hands are  atraumatic, skin intact throughout.  Compartments soft and compressible.  Good thenar muscle bulk. Good wrist and finger motion with all tendons intact.  Positive Tinel's and positive Phalen's at the wrist.  Negative Spurling's test.  Brisk capillary refill to all fingers.     Imaging: please see separate imaging dictation.     Assessment/Plan: 47 y.o. male with bilateral carpal tunnel syndrome.  We had a discussion about the diagnosis, relevant anatomy, etiology, as well as treatment options.  He did have an EMG a few months ago which I reviewed and confirms moderate carpal tunnel syndrome bilaterally.  He has been wearing night splints for a long time, greater than 6 months.  We discussed other options.  He is ready to proceed with a left carpal tunnel release.  We discussed the technique, risks, benefits, alternatives, and expectations.              Jim Crawley M.D.

## 2022-12-08 NOTE — ANESTHESIA PREPROCEDURE EVALUATION
Case: [id number] Date/Time: 12/08/22 0800    Procedure: LEFT OPEN CARPAL TUNNEL RELEASE    Diagnosis: Carpal tunnel syndrome on left [G56.02]    Pre-op diagnosis: Carpal tunnel syndrome on left [G56.02]    Location: [facility] ROOM 21 / SURGERY SAME DAY [facility]    Surgeons: Jim Crawley M.D.          Relevant Problems   ANESTHESIA   (positive) Obstructive sleep apnea syndrome      CARDIAC   (positive) Hypertension      GI   (positive) Gastroesophageal reflux disease without esophagitis      Other   (positive) ADD (attention deficit disorder) without hyperactivity   (positive) Anxiety disorder   (positive) Carpal tunnel syndrome on left   (positive) Obese       Physical Exam    Airway   Mallampati: II  TM distance: >3 FB  Neck ROM: full       Cardiovascular - normal exam  Rhythm: regular  Rate: normal  (-) murmur     Dental - normal exam           Pulmonary - normal exam  Breath sounds clear to auscultation     Abdominal    Neurological - normal exam                 Anesthesia Plan    ASA 2       Plan - MAC               Induction: intravenous    Postoperative Plan: Postoperative administration of opioids is intended.    Pertinent diagnostic labs and testing reviewed    Informed Consent:    Anesthetic plan and risks discussed with patient.

## 2022-12-08 NOTE — ANESTHESIA POSTPROCEDURE EVALUATION
Patient: Willie Smith    Procedure Summary     Date: 12/08/22 Room / Location: [facility] ROOM 21 / SURGERY SAME DAY [facility]    Anesthesia Start: 0915 Anesthesia Stop: 0935    Procedure: LEFT OPEN CARPAL TUNNEL RELEASE (Wrist) Diagnosis:       Carpal tunnel syndrome on left      (Carpal tunnel syndrome on left [G56.02])    Surgeons: Jim Crawley M.D. Responsible Provider: Rigo Chu M.D.    Anesthesia Type: MAC ASA Status: 2          Final Anesthesia Type: MAC  Last vitals  BP   Blood Pressure: (!) 140/70    Temp   37 °C (98.6 °F)    Pulse   97   Resp   18    SpO2   98 %      Anesthesia Post Evaluation    Patient location during evaluation: PACU  Patient participation: complete - patient participated  Level of consciousness: awake and alert  Pain score: 0    Airway patency: patent  Anesthetic complications: no  Cardiovascular status: hemodynamically stable  Respiratory status: acceptable  Hydration status: euvolemic    PONV: none          There were no known notable events for this encounter.

## 2022-12-08 NOTE — DISCHARGE INSTRUCTIONS
HOME CARE INSTRUCTIONS    ACTIVITY: Rest and take it easy for the first 24 hours.  A responsible adult is recommended to remain with you during that time.  It is normal to feel sleepy.  We encourage you to not do anything that requires balance, judgment or coordination.    FOR 24 HOURS DO NOT:  Drive, operate machinery or run household appliances.  Drink beer or alcoholic beverages.  Make important decisions or sign legal documents.    SPECIAL INSTRUCTIONS: See above     DIET: To avoid nausea, slowly advance diet as tolerated, avoiding spicy or greasy foods for the first day.  Add more substantial food to your diet according to your physician's instructions.  Babies can be fed formula or breast milk as soon as they are hungry.  INCREASE FLUIDS AND FIBER TO AVOID CONSTIPATION.    SURGICAL DRESSING/BATHING: Keep dressing clean and dry, OK to shower tomorrow.  Wrap arm in plastic bag with tape up-top to ensure dressing stays dry.      MEDICATIONS: Resume taking daily medication.  Take prescribed pain medication with food.  If no medication is prescribed, you may take non-aspirin pain medication if needed.  PAIN MEDICATION CAN BE VERY CONSTIPATING.  Take a stool softener or laxative such as senokot, pericolace, or milk of magnesia if needed.    Prescription given for Oxycodone.  Last pain medication given: You had Toradol at 9:20.  This in an NSAID similar to ibuprofin/motrin.  OK to take Motrin again at 3:20pm.    A follow-up appointment should be arranged with your doctor; call to schedule.    You should CALL YOUR PHYSICIAN if you develop:  Fever greater than 101 degrees F.  Pain not relieved by medication, or persistent nausea or vomiting.  Excessive bleeding (blood soaking through dressing) or unexpected drainage from the wound.  Extreme redness or swelling around the incision site, drainage of pus or foul smelling drainage.  Inability to urinate or empty your bladder within 8 hours.  Problems with breathing or  chest pain.    You should call 911 if you develop problems with breathing or chest pain.  If you are unable to contact your doctor or surgical center, you should go to the nearest emergency room or urgent care center.  Physician's telephone #: Dr. Crawley [phone]    MILD FLU-LIKE SYMPTOMS ARE NORMAL.  YOU MAY EXPERIENCE GENERALIZED MUSCLE ACHES, THROAT IRRITATION, HEADACHE AND/OR SOME NAUSEA.    If any questions arise, call your doctor.  If your doctor is not available, please feel free to call the Surgical Center at [phone].  The Center is open Monday through Friday from 7AM to 7PM.      A registered nurse may call you a few days after your surgery to see how you are doing after your procedure.    You may also receive a survey in the mail within the next two weeks and we ask that you take a few moments to complete the survey and return it to us.  Our goal is to provide you with very good care and we value your comments.     Depression / Suicide Risk    As you are discharged from this [facility] facility, it is important to learn how to keep safe from harming yourself.    Recognize the warning signs:  Abrupt changes in personality, positive or negative- including increase in energy   Giving away possessions  Change in eating patterns- significant weight changes-  positive or negative  Change in sleeping patterns- unable to sleep or sleeping all the time   Unwillingness or inability to communicate  Depression  Unusual sadness, discouragement and loneliness  Talk of wanting to die  Neglect of personal appearance   Rebelliousness- reckless behavior  Withdrawal from people/activities they love  Confusion- inability to concentrate     If you or a loved one observes any of these behaviors or has concerns about self-harm, here's what you can do:  Talk about it- your feelings and reasons for harming yourself  Remove any means that you might use to hurt yourself (examples: pills, rope, extension cords,  firearm)  Get professional help from the community (Mental Health, Substance Abuse, psychological counseling)  Do not be alone:Call your Safe Contact- someone whom you trust who will be there for you.  Call your local CRISIS HOTLINE 687-9033 or 512-089-5720  Call your local Children's Mobile Crisis Response Team [place] (046) 045-1901 or www.SeniorLiving.Net  Call the toll free National Suicide Prevention Hotlines   National Suicide Prevention Lifeline 650-298-RBGQ (1229)  [facility] Line Network 800-SUICIDE (418-0061)    I acknowledge receipt and understanding of these Home Care instructions.

## 2022-12-08 NOTE — DISCHARGE INSTR - OTHER INFO
DR. CRAWLEY'S POST-OPERATIVE INSTRUCTIONS    You have just undergone a surgery by Dr. Crawley in the operating room.  It is our wish that your postoperative recovery be as quick and comfortable as possible.  Please carefully review the following items that are important for your recovery.    After any operation, a certain degree of pain is to be expected. Take Advil (ibuprofen) and Extra Strength Tylenol as first line medications for mild to moderate pain. Taking each one every 6 hours, and staggering them so that you are taking one medicine every 3 hours, is the most effective. Refer to dosing instructions on the bottle, but in general ibuprofen dose is 600-800mg and Tylenol dose is 500-1000mg. For most small procedures, this should be enough to keep you comfortable. Take these medications until your followup visit. You may have been given a small prescription for stronger pain medicine which will help relieve more severe pain.  Pain medicine may make you drowsy so please keep this in mind.  Do not drive while taking pain medicine.      When you go home, please keep your operated arm elevated at all times (above the level of your heart).  If you do this, your swelling will resolve more quickly and your pain will be improve more quickly as well. You may also place an ice pack over your dressing or splint to help with swelling and pain.    It is also very important to keep your fingers moving after most procedures, unless you had a tendon repair or fracture repair in which case you will be in a splint. Keep all of your fingers moving through a full range of motion to prevent stiffness.    For small hand procedures such as carpal tunnel release, trigger finger release, and cyst excision, the dressing that you have on your extremity should remain on for 3-4 days. It may then be removed, you can wash the incision  gently with soap and water, and keep the incision covered with a band-aid or similar clean dressing. For larger procedures or if you have a splint on, these should remain on until follow up or as specifically instructed. If you feel that your dressing is too tight during the first 3 days after surgery, you may loosen it. It is normal to see minor staining on the dressing after surgery. If there is significant bleeding, you are advised to call the office during regular office hours to have this checked.  Make sure that your dressing is kept dry at all times.  You can take a shower if you cover your arm with a plastic bag. If your dressing gets wet, replace it with sterile dressing that you can obtain from your local drug store.    Please call our office for a follow-up appointment, [phone]. Follow up after surgery is typically 10-14 days, unless you were specifically instructed otherwise. The sutures will be removed and you may be asked to see a hand therapist to optimize your functional result. Each of the hand therapists that you will be referred to have received special training in the care of the hand and upper extremity.    If you have questions regarding your surgery postop that you feel requires attention, please call the office at [phone] during business hours, or [phone] after hours for the answering service. If you feel that you have a surgical emergency postoperatively that requires immediate attention, please call the above numbers or go to the Emergency Department and ask for the Orthopedic Surgeon on call.

## 2022-12-08 NOTE — OP REPORT
OPERATIVE NOTE     DATE OF PROCEDURE: 12/8/2022            PRE-OP DIAGNOSIS: left carpal tunnel syndrome            POST-OP DIAGNOSIS: same            PROCEDURE: left carpal tunnel release            SURGEON: Jim Crawley M.D. - Primary            ANESTHESIA: MAC            ESTIMATED BLOOD LOSS: minimal                   SPECIMENS: none            COMPLICATIONS: none            CONDITION: stable to PACU            OPERATIVE INDICATIONS AND DESCRIPTION OF PROCEDURE: This is a pleasant 47 y.o. male who presented to my office with left carpal tunnel syndrome.  Diagnosis was confirmed with an EMG.  They failed conservative treatments.  We discussed carpal tunnel release.  We discussed the operative technique, risks, benefits, alternatives, as well as expectations postoperatively.  Risks including, but not limited to, bleeding, infection, stiffness, tendon adhesions, chronic pain around the incision, recurrence of symptoms, incomplete resolution of symptoms, expected recovery depending on preoperative EMG findings, risks of anesthesia, were discussed.  They elected to proceed.  The patient was seen in the preoperative holding area, identified, and the wrist was marked.  They were taken back to the operating room.  Light sedation was induced by the anesthesia provider. A time out was performed. A median nerve block was performed using a 10cc (50/50%) mixture of 1% lidocaine and 0.5% bupivacaine.  A tourniquet was placed on the forearm and the upper extremity was prepped and draped in usual orthopedic fashion.  Another timeout was performed.  The tourniquet was inflated to 250 mmHg.  A 1 cm longitudinal incision was made in the palm.  I bluntly dissected through the subcutaneous tissues and down to the palmar fascia.  The palmar fascia was longitudinally split.  Deep retractors were placed.  The transverse carpal ligament was identified.  I incised the distal part of the transverse carpal ligament with a 15 blade. I  identified the median nerve. I placed the safeguard sled underneath the transverse carpal ligament proximally, and above the median nerve, making sure to protect the nerve at all times.  Once I was happy with the safe positioning of the sled and that the nerve was protected, I slid the safeguard blade proximally along the groove of the sled, transecting the remaining portion of the transverse carpal ligament.  I visually confirmed complete transection of the entire ligament and that there were no further sites of compression on the nerve. The nerve was intact and uninjured throughout the case. I inspected the distal part of the transverse carpal ligament to make sure it was completely transected.  The wound was then thoroughly irrigated with normal saline.  The skin was closed with 4-0 nylon suture.  A sterile dressing was placed. The tourniquet was deflated.  The patient awoke from anesthesia and was transferred to the PACU in stable condition.

## 2022-12-08 NOTE — OR NURSING
1008 report from Jil ROWE. Pt over to phase 2 and up to recliner.     1015 family brought to bedside and d/c instructions discussed. All questions answered.     1025 pt meets d/c criteria. IV removed.  Pt d/c to home with family, escorted out by staff, all belongings sent with pt.

## 2022-12-09 RX ORDER — TESTOSTERONE CYPIONATE 200 MG/ML
INJECTION, SOLUTION INTRAMUSCULAR
Qty: 2 ML | Refills: 0 | Status: SHIPPED | OUTPATIENT
Start: 2022-12-09 | End: 2022-12-13 | Stop reason: SDUPTHER

## 2022-12-13 DIAGNOSIS — R79.89 LOW TESTOSTERONE: ICD-10-CM

## 2022-12-13 RX ORDER — TESTOSTERONE CYPIONATE 200 MG/ML
INJECTION, SOLUTION INTRAMUSCULAR
Qty: 2 ML | Refills: 0 | Status: SHIPPED | OUTPATIENT
Start: 2022-12-13 | End: 2022-12-27

## 2022-12-26 DIAGNOSIS — R79.89 LOW TESTOSTERONE: ICD-10-CM

## 2022-12-27 RX ORDER — TESTOSTERONE CYPIONATE 200 MG/ML
INJECTION, SOLUTION INTRAMUSCULAR
Qty: 2 ML | Refills: 0 | Status: SHIPPED | OUTPATIENT
Start: 2022-12-27 | End: 2023-01-10

## 2022-12-27 NOTE — TELEPHONE ENCOUNTER
Received request via: Pharmacy    Was the patient seen in the last year in this department? Yes  LOV 08/22/2022  Does the patient have an active prescription (recently filled or refills available) for medication(s) requested? No    Does the patient have custodial Plus and need 100 day supply (blood pressure, diabetes and cholesterol meds only)? Medication is not for cholesterol, blood pressure or diabetes and Patient does not have SCP

## 2023-02-17 ENCOUNTER — TELEMEDICINE (OUTPATIENT)
Dept: MEDICAL GROUP | Facility: LAB | Age: 48
End: 2023-02-17
Payer: COMMERCIAL

## 2023-02-17 DIAGNOSIS — R79.89 LOW TESTOSTERONE: ICD-10-CM

## 2023-02-17 PROCEDURE — 99213 OFFICE O/P EST LOW 20 MIN: CPT | Mod: 95 | Performed by: PHYSICIAN ASSISTANT

## 2023-02-17 RX ORDER — TESTOSTERONE CYPIONATE 200 MG/ML
INJECTION, SOLUTION INTRAMUSCULAR
Qty: 2 ML | Refills: 0 | Status: SHIPPED | OUTPATIENT
Start: 2023-02-17 | End: 2023-03-24 | Stop reason: SDUPTHER

## 2023-02-18 NOTE — PROGRESS NOTES
Virtual Visit: Established Patient   This visit was conducted via Zoom using secure and encrypted videoconferencing technology.   The patient was in their home in the state [facility].    The patient's identity was confirmed and verbal consent was obtained for this virtual visit.    Subjective:   CC: No chief complaint on file.    Willie Smith is a 48 y.o. male presenting for evaluation and management of:    Low testosterone  BMP up-to-date.  Needs updated CBC and testosterone. Denies headaches  Denies urinary changes, prostate symptoms. Taking flomax regularly, usually getting up to urinate once per night. Denies hypertensive symptoms    Not taking ADD medication    Blood pressure well controlled, recently checked at Urgent care 110s/70s. Flu, strep and Covid were negative. Rx'd abx but did not fill prescription.       ROS   All ROS negative except for pertinent positives listed above.       Current medicines (including changes today)  Current Outpatient Medications   Medication Sig Dispense Refill    testosterone cypionate (DEPO-TESTOSTERONE) 200 MG/ML Solution injection INJECT 150 MG INTO THE SHOULDER, THIGH, OR BUTTOCKS EVERY 14 DAYS 2 mL 0    Multiple Vitamin (MULTIVITAMIN PO) Take 1 Tablet by mouth every day.      Azelastine HCl 0.15 % Solution 1 SPRAY PER NOSTRIL ONCE PER DAY (Patient taking differently: 1 time a day as needed. 1 spray per nostril once per day) 4 mL 0    losartan (COZAAR) 100 MG Tab Take 1 Tablet by mouth every day. 90 Tablet 3    fluoxetine (PROZAC) 40 MG capsule Take 1 Capsule by mouth every day. 90 Capsule 3    topiramate (TOPAMAX) 100 MG Tab Take 100 mg by mouth at bedtime.      Zolpidem Tartrate 3.5 MG SL Tab Take 3.5 mg by mouth at bedtime as needed.      buPROPion (WELLBUTRIN XL) 300 MG XL tablet Take 1 Tablet by mouth every morning.      tamsulosin (FLOMAX) 0.4 MG capsule Take 0.4 mg by mouth 1/2 hour after breakfast.      hydroCHLOROthiazide (HYDRODIURIL) 12.5 MG tablet  Take 12.5 mg by mouth every day.      naproxen (NAPROSYN) 500 MG Tab Take 500 mg by mouth 2 times a day with meals.      sildenafil citrate (VIAGRA) 50 MG tablet Take 50 mg by mouth 1 time a day as needed.      pantoprazole (PROTONIX) 40 MG Tablet Delayed Response Take 40 mg by mouth every day.       No current facility-administered medications for this visit.       Patient Active Problem List    Diagnosis Date Noted    Carpal tunnel syndrome on left 08/01/2022    Chronic bilateral low back pain with bilateral sciatica 03/19/2022    Benign prostatic hyperplasia 03/18/2022    Low testosterone 06/10/2021    Laceration of right foot 03/05/2020    Anxiety disorder 01/13/2020    Tinnitus 12/05/2019    Hypertension 11/25/2019    ADD (attention deficit disorder) without hyperactivity 03/06/2018    Moderate episode of recurrent major depressive disorder (HCC) 03/06/2018    Obstructive sleep apnea syndrome 01/29/2018    Insomnia 01/12/2018    Vitamin D deficiency 11/08/2016    Gastroesophageal reflux disease without esophagitis 10/04/2016    Depression 10/04/2016    Disorder of uvula of palate 10/04/2016    Obese 10/04/2016        Objective:   There were no vitals taken for this visit.    Physical Exam:  Constitutional: Alert, no distress, well-groomed.  Skin: No rashes in visible areas.  Eye: Round. Conjunctiva clear, lids normal. No icterus.   ENMT: Lips pink without lesions, good dentition, moist mucous membranes. Phonation normal.  Neck: No masses, no thyromegaly. Moves freely without pain.  Respiratory: Unlabored respiratory effort, no cough or audible wheeze  Psych: Alert and oriented x3, normal affect and mood.     Assessment and Plan:   The following treatment plan was discussed:     1. Low testosterone  - Testosterone, Free & Total, Adult Male (w/SHBG); Future  - CBC WITH DIFFERENTIAL; Future  - testosterone cypionate (DEPO-TESTOSTERONE) 200 MG/ML Solution injection; INJECT 150 MG INTO THE SHOULDER, THIGH, OR  BUTTOCKS EVERY 14 DAYS  Dispense: 2 mL; Refill: 0  UDS UTD  PDMP reviewed  Pt needs updated labs for further refills    Follow-up: Return in about 6 months (around 8/17/2023).

## 2023-03-03 RX ORDER — PANTOPRAZOLE SODIUM 40 MG/1
40 TABLET, DELAYED RELEASE ORAL DAILY
Qty: 90 TABLET | Refills: 0 | Status: SHIPPED | OUTPATIENT
Start: 2023-03-03 | End: 2023-06-12

## 2023-03-03 RX ORDER — BUPROPION HYDROCHLORIDE 300 MG/1
300 TABLET ORAL EVERY MORNING
Qty: 90 TABLET | Refills: 0 | Status: SHIPPED | OUTPATIENT
Start: 2023-03-03 | End: 2023-06-12

## 2023-03-03 RX ORDER — TAMSULOSIN HYDROCHLORIDE 0.4 MG/1
0.4 CAPSULE ORAL
Qty: 90 CAPSULE | Refills: 0 | Status: SHIPPED | OUTPATIENT
Start: 2023-03-03 | End: 2023-08-31 | Stop reason: SDUPTHER

## 2023-03-03 NOTE — TELEPHONE ENCOUNTER
Received request via: Patient    Was the patient seen in the last year in this department? Yes    Does the patient have an active prescription (recently filled or refills available) for medication(s) requested? No    Does the patient have long term Plus and need 100 day supply (blood pressure, diabetes and cholesterol meds only)? Patient does not have SCP    Please send to [facility] in [place]

## 2023-03-24 ENCOUNTER — OFFICE VISIT (OUTPATIENT)
Dept: MEDICAL GROUP | Facility: LAB | Age: 48
End: 2023-03-24
Payer: COMMERCIAL

## 2023-03-24 VITALS
HEIGHT: 75 IN | RESPIRATION RATE: 16 BRPM | WEIGHT: 251.32 LBS | OXYGEN SATURATION: 98 % | HEART RATE: 78 BPM | TEMPERATURE: 97 F | BODY MASS INDEX: 31.25 KG/M2 | SYSTOLIC BLOOD PRESSURE: 132 MMHG | DIASTOLIC BLOOD PRESSURE: 70 MMHG

## 2023-03-24 DIAGNOSIS — J32.0 CHRONIC MAXILLARY SINUSITIS: ICD-10-CM

## 2023-03-24 DIAGNOSIS — R79.89 LOW TESTOSTERONE: ICD-10-CM

## 2023-03-24 PROCEDURE — 99214 OFFICE O/P EST MOD 30 MIN: CPT | Performed by: PHYSICIAN ASSISTANT

## 2023-03-24 RX ORDER — TESTOSTERONE CYPIONATE 200 MG/ML
INJECTION, SOLUTION INTRAMUSCULAR
Qty: 2 ML | Refills: 0 | Status: SHIPPED | OUTPATIENT
Start: 2023-03-24 | End: 2023-05-01

## 2023-03-24 RX ORDER — CLOTRIMAZOLE AND BETAMETHASONE DIPROPIONATE 10; .64 MG/G; MG/G
1 CREAM TOPICAL 2 TIMES DAILY
Qty: 45 G | Refills: 0 | Status: SHIPPED | OUTPATIENT
Start: 2023-03-24

## 2023-03-24 RX ORDER — AMOXICILLIN AND CLAVULANATE POTASSIUM 875; 125 MG/1; MG/1
1 TABLET, FILM COATED ORAL 2 TIMES DAILY
Qty: 10 TABLET | Refills: 0 | Status: SHIPPED | OUTPATIENT
Start: 2023-03-24 | End: 2023-03-29

## 2023-03-24 RX ORDER — FLUCONAZOLE 150 MG/1
TABLET ORAL
Qty: 2 TABLET | Refills: 0 | Status: SHIPPED | OUTPATIENT
Start: 2023-03-24 | End: 2023-06-16

## 2023-03-24 ASSESSMENT — PATIENT HEALTH QUESTIONNAIRE - PHQ9
6. FEELING BAD ABOUT YOURSELF - OR THAT YOU ARE A FAILURE OR HAVE LET YOURSELF OR YOUR FAMILY DOWN: NOT AL ALL
4. FEELING TIRED OR HAVING LITTLE ENERGY: NOT AT ALL
9. THOUGHTS THAT YOU WOULD BE BETTER OFF DEAD, OR OF HURTING YOURSELF: NOT AT ALL
SUM OF ALL RESPONSES TO PHQ QUESTIONS 1-9: 0
8. MOVING OR SPEAKING SO SLOWLY THAT OTHER PEOPLE COULD HAVE NOTICED. OR THE OPPOSITE, BEING SO FIGETY OR RESTLESS THAT YOU HAVE BEEN MOVING AROUND A LOT MORE THAN USUAL: NOT AT ALL
2. FEELING DOWN, DEPRESSED, IRRITABLE, OR HOPELESS: NOT AT ALL
7. TROUBLE CONCENTRATING ON THINGS, SUCH AS READING THE NEWSPAPER OR WATCHING TELEVISION: NOT AT ALL
SUM OF ALL RESPONSES TO PHQ9 QUESTIONS 1 AND 2: 0
1. LITTLE INTEREST OR PLEASURE IN DOING THINGS: NOT AT ALL
3. TROUBLE FALLING OR STAYING ASLEEP OR SLEEPING TOO MUCH: NOT AT ALL
5. POOR APPETITE OR OVEREATING: NOT AT ALL

## 2023-03-24 ASSESSMENT — FIBROSIS 4 INDEX: FIB4 SCORE: 1.01

## 2023-03-24 NOTE — PROGRESS NOTES
Subjective:     CC: URI symptoms, low testosterone    HPI:   Willie here today with     Cough, sinus pressure, sinus drainage x several weeks. Eval at urgent care 2 months ago for same complaint, rx'd abx and allergy medication. Pt did not take abx and took the allergy medication for a short time.  Patient reports symptoms did not improve with this.  He also endorses a rash on either corner of her lips which is slightly irritated, particular with yawning or opening his mouth wide.  Denies fever, chills, wheezing SOB.     Low testosterone  Chronic condition, stable  Patient has gotten labs updated which are consistent with low testosterone.  Plan to resume current medication dosage and recheck in 1 week      ROS:  All ROS negative except for pertinent positives listed above.       Current Outpatient Medications Ordered in Epic   Medication Sig Dispense Refill    testosterone cypionate (DEPO-TESTOSTERONE) 200 MG/ML Solution injection INJECT 150 MG INTO THE SHOULDER, THIGH, OR BUTTOCKS EVERY 14 DAYS 2 mL 0    fluconazole (DIFLUCAN) 150 MG tablet 1 tablet at onset of symptoms, ok to repeat dose after 7 days if symptoms 2 Tablet 0    amoxicillin-clavulanate (AUGMENTIN) 875-125 MG Tab Take 1 Tablet by mouth 2 times a day for 5 days. 10 Tablet 0    clotrimazole-betamethasone (LOTRISONE) 1-0.05 % Cream Apply 1 Application. topically 2 times a day. 45 g 0    buPROPion (WELLBUTRIN XL) 300 MG XL tablet Take 1 Tablet by mouth every morning. 90 Tablet 0    tamsulosin (FLOMAX) 0.4 MG capsule Take 1 Capsule by mouth 1/2 hour after breakfast. 90 Capsule 0    pantoprazole (PROTONIX) 40 MG Tablet Delayed Response Take 1 Tablet by mouth every day. 90 Tablet 0    Multiple Vitamin (MULTIVITAMIN PO) Take 1 Tablet by mouth every day.      Azelastine HCl 0.15 % Solution 1 SPRAY PER NOSTRIL ONCE PER DAY (Patient taking differently: 1 time a day as needed. 1 spray per nostril once per day) 4 mL 0    losartan (COZAAR) 100 MG Tab Take 1 Tablet  "by mouth every day. 90 Tablet 3    fluoxetine (PROZAC) 40 MG capsule Take 1 Capsule by mouth every day. 90 Capsule 3    topiramate (TOPAMAX) 100 MG Tab Take 100 mg by mouth at bedtime.      Zolpidem Tartrate 3.5 MG SL Tab Take 3.5 mg by mouth at bedtime as needed.      hydroCHLOROthiazide (HYDRODIURIL) 12.5 MG tablet Take 12.5 mg by mouth every day.      naproxen (NAPROSYN) 500 MG Tab Take 500 mg by mouth 2 times a day with meals.      sildenafil citrate (VIAGRA) 50 MG tablet Take 50 mg by mouth 1 time a day as needed.       No current Epic-ordered facility-administered medications on file.         Objective:     Exam:  /70 (BP Location: Left arm, Patient Position: Sitting, BP Cuff Size: Adult long)   Pulse 78   Temp 36.1 °C (97 °F)   Resp 16   Ht 1.905 m (6' 3\")   Wt 114 kg (251 lb 5.2 oz)   SpO2 98%   BMI 31.41 kg/m²  Body mass index is 31.41 kg/m².    Gen: Alert and oriented, No apparent distress.  HEENT: PERRL; EOMI, nl conjunctiva & lids, hearing grossly nl, EACs within normal limits and TMs trace air-fluid level bilaterally, good dentition, OP clear with some posterior drainage noted.  Slightly tender to palpation over maxillary region  Neck: Neck is supple without lymphadenopathy.  Lungs: Normal effort, CTA bilaterally, no wheezes, rhonchi, or rales  CV: Regular rate and rhythm. No murmurs, rubs, or gallops.  Ext: No clubbing, cyanosis, edema.          Assessment & Plan:     48 y.o. male with the following -     Problem List Items Addressed This Visit       Low testosterone    Relevant Medications    testosterone cypionate (DEPO-TESTOSTERONE) 200 MG/ML Solution injection    Other Relevant Orders    Testosterone, Free & Total, Adult Male (w/SHBG)     Other Visit Diagnoses       Chronic maxillary sinusitis        Relevant Medications    fluconazole (DIFLUCAN) 150 MG tablet    amoxicillin-clavulanate (AUGMENTIN) 875-125 MG Tab    clotrimazole-betamethasone (LOTRISONE) 1-0.05 % Cream            I " spent a total of 16 minutes with record review, exam, communication with the patient, communication with other providers, and documentation of this encounter.      Return in about 3 months (around 6/24/2023) for Controlled Substances.    Please note that this dictation was created using voice recognition software. I have made every reasonable attempt to correct obvious errors, but there may be errors of grammar and possibly content that I did not discover before finalizing the note.

## 2023-04-28 DIAGNOSIS — R79.89 LOW TESTOSTERONE: ICD-10-CM

## 2023-05-01 ENCOUNTER — PATIENT MESSAGE (OUTPATIENT)
Dept: MEDICAL GROUP | Facility: LAB | Age: 48
End: 2023-05-01
Payer: COMMERCIAL

## 2023-05-01 RX ORDER — TESTOSTERONE CYPIONATE 200 MG/ML
INJECTION, SOLUTION INTRAMUSCULAR
Qty: 2 ML | Refills: 0 | Status: SHIPPED | OUTPATIENT
Start: 2023-05-01 | End: 2023-06-16

## 2023-05-01 RX ORDER — HYDROCHLOROTHIAZIDE 12.5 MG/1
12.5 TABLET ORAL DAILY
Qty: 90 TABLET | Refills: 3 | Status: SHIPPED | OUTPATIENT
Start: 2023-05-01 | End: 2023-08-31 | Stop reason: SDUPTHER

## 2023-05-12 DIAGNOSIS — J30.2 SEASONAL ALLERGIES: ICD-10-CM

## 2023-05-12 RX ORDER — AZELASTINE HCL 205.5 UG/1
SPRAY NASAL
Qty: 30 ML | Refills: 1 | Status: SHIPPED | OUTPATIENT
Start: 2023-05-12 | End: 2023-06-13

## 2023-05-12 NOTE — TELEPHONE ENCOUNTER
Received request via: Pharmacy    Was the patient seen in the last year in this department? Yes    Does the patient have an active prescription (recently filled or refills available) for medication(s) requested? No    Does the patient have [facility] Plus and need 100 day supply (blood pressure, diabetes and cholesterol meds only)? Patient does not have SCP      ASTEPRO 205.5 MCG/SPRAY Solution

## 2023-06-12 RX ORDER — PANTOPRAZOLE SODIUM 40 MG/1
40 TABLET, DELAYED RELEASE ORAL DAILY
Qty: 90 TABLET | Refills: 0 | Status: SHIPPED | OUTPATIENT
Start: 2023-06-12 | End: 2023-08-31 | Stop reason: SDUPTHER

## 2023-06-12 RX ORDER — BUPROPION HYDROCHLORIDE 300 MG/1
TABLET ORAL
Qty: 90 TABLET | Refills: 0 | Status: SHIPPED | OUTPATIENT
Start: 2023-06-12 | End: 2023-08-31 | Stop reason: SDUPTHER

## 2023-06-12 NOTE — TELEPHONE ENCOUNTER
Received request via: Pharmacy    Was the patient seen in the last year in this department? Yes  LOV 03/24/2023  Does the patient have an active prescription (recently filled or refills available) for medication(s) requested? No    Does the patient have intermediate Plus and need 100 day supply (blood pressure, diabetes and cholesterol meds only)? Patient does not have SCP

## 2023-06-13 DIAGNOSIS — J30.2 SEASONAL ALLERGIES: ICD-10-CM

## 2023-06-13 RX ORDER — AZELASTINE HCL 205.5 UG/1
SPRAY NASAL
Qty: 11 ML | Refills: 1 | Status: SHIPPED | OUTPATIENT
Start: 2023-06-13 | End: 2023-08-31 | Stop reason: SDUPTHER

## 2023-06-16 DIAGNOSIS — R79.89 LOW TESTOSTERONE: ICD-10-CM

## 2023-06-16 DIAGNOSIS — J32.0 CHRONIC MAXILLARY SINUSITIS: ICD-10-CM

## 2023-06-16 RX ORDER — FLUCONAZOLE 150 MG/1
TABLET ORAL
Qty: 2 TABLET | Refills: 0 | Status: SHIPPED | OUTPATIENT
Start: 2023-06-16 | End: 2023-08-31 | Stop reason: SDUPTHER

## 2023-06-16 RX ORDER — TESTOSTERONE CYPIONATE 200 MG/ML
INJECTION, SOLUTION INTRAMUSCULAR
Qty: 2 ML | Refills: 0 | Status: SHIPPED | OUTPATIENT
Start: 2023-06-16 | End: 2023-07-18 | Stop reason: SDUPTHER

## 2023-07-18 DIAGNOSIS — R79.89 LOW TESTOSTERONE: ICD-10-CM

## 2023-07-19 RX ORDER — TESTOSTERONE CYPIONATE 200 MG/ML
INJECTION, SOLUTION INTRAMUSCULAR
Qty: 2 ML | Refills: 0 | Status: SHIPPED | OUTPATIENT
Start: 2023-07-19 | End: 2023-08-30

## 2023-08-29 DIAGNOSIS — R79.89 LOW TESTOSTERONE: ICD-10-CM

## 2023-08-30 RX ORDER — TESTOSTERONE CYPIONATE 200 MG/ML
INJECTION, SOLUTION INTRAMUSCULAR
Qty: 2 ML | Refills: 0 | Status: SHIPPED | OUTPATIENT
Start: 2023-08-30 | End: 2023-08-31 | Stop reason: SDUPTHER

## 2023-08-31 DIAGNOSIS — J32.0 CHRONIC MAXILLARY SINUSITIS: ICD-10-CM

## 2023-08-31 DIAGNOSIS — I10 ESSENTIAL HYPERTENSION: ICD-10-CM

## 2023-08-31 DIAGNOSIS — F33.0 MILD EPISODE OF RECURRENT MAJOR DEPRESSIVE DISORDER (HCC): ICD-10-CM

## 2023-08-31 DIAGNOSIS — R79.89 LOW TESTOSTERONE: ICD-10-CM

## 2023-08-31 DIAGNOSIS — J30.2 SEASONAL ALLERGIES: ICD-10-CM

## 2023-08-31 NOTE — TELEPHONE ENCOUNTER
Received request via: Patient    Was the patient seen in the last year in this department? Yes  LOV: 3/24/2023  Does the patient have an active prescription (recently filled or refills available) for medication(s) requested? No    Does the patient have [facility] Plus and need 100 day supply (blood pressure, diabetes and cholesterol meds only)? Patient does not have SCP

## 2023-09-01 RX ORDER — HYDROCHLOROTHIAZIDE 12.5 MG/1
12.5 TABLET ORAL DAILY
Qty: 90 TABLET | Refills: 3 | Status: SHIPPED | OUTPATIENT
Start: 2023-09-01 | End: 2023-12-11 | Stop reason: SDUPTHER

## 2023-09-01 RX ORDER — PANTOPRAZOLE SODIUM 40 MG/1
40 TABLET, DELAYED RELEASE ORAL DAILY
Qty: 90 TABLET | Refills: 0 | Status: SHIPPED | OUTPATIENT
Start: 2023-09-01 | End: 2023-10-10 | Stop reason: SDUPTHER

## 2023-09-01 RX ORDER — AZELASTINE HCL 205.5 UG/1
SPRAY NASAL
Qty: 11 ML | Refills: 1 | Status: SHIPPED | OUTPATIENT
Start: 2023-09-01 | End: 2023-12-11 | Stop reason: SDUPTHER

## 2023-09-01 RX ORDER — TAMSULOSIN HYDROCHLORIDE 0.4 MG/1
0.4 CAPSULE ORAL
Qty: 90 CAPSULE | Refills: 0 | Status: SHIPPED | OUTPATIENT
Start: 2023-09-01 | End: 2023-10-10 | Stop reason: SDUPTHER

## 2023-09-01 RX ORDER — TESTOSTERONE CYPIONATE 200 MG/ML
INJECTION, SOLUTION INTRAMUSCULAR
Qty: 2 ML | Refills: 0 | Status: SHIPPED | OUTPATIENT
Start: 2023-09-01 | End: 2023-09-30

## 2023-09-01 RX ORDER — LOSARTAN POTASSIUM 100 MG/1
100 TABLET ORAL
Qty: 90 TABLET | Refills: 3 | Status: SHIPPED | OUTPATIENT
Start: 2023-09-01 | End: 2023-12-11 | Stop reason: SDUPTHER

## 2023-09-01 RX ORDER — FLUCONAZOLE 150 MG/1
TABLET ORAL
Qty: 2 TABLET | Refills: 0 | Status: SHIPPED | OUTPATIENT
Start: 2023-09-01 | End: 2023-12-11 | Stop reason: SDUPTHER

## 2023-09-01 RX ORDER — BUPROPION HYDROCHLORIDE 300 MG/1
300 TABLET ORAL EVERY MORNING
Qty: 90 TABLET | Refills: 0 | Status: SHIPPED | OUTPATIENT
Start: 2023-09-01 | End: 2023-09-11

## 2023-09-01 RX ORDER — FLUOXETINE HYDROCHLORIDE 40 MG/1
40 CAPSULE ORAL DAILY
Qty: 90 CAPSULE | Refills: 3 | Status: SHIPPED | OUTPATIENT
Start: 2023-09-01 | End: 2023-12-11 | Stop reason: SDUPTHER

## 2023-09-11 RX ORDER — BUPROPION HYDROCHLORIDE 300 MG/1
300 TABLET ORAL EVERY MORNING
Qty: 90 TABLET | Refills: 0 | Status: SHIPPED | OUTPATIENT
Start: 2023-09-11 | End: 2023-10-10 | Stop reason: SDUPTHER

## 2023-09-11 NOTE — TELEPHONE ENCOUNTER
Received request via: Pharmacy    Was the patient seen in the last year in this department? Yes    Does the patient have an active prescription (recently filled or refills available) for medication(s) requested? No. Was sent to Pharmacy in [place]     Does the patient have skilled nursing Plus and need 100 day supply (blood pressure, diabetes and cholesterol meds only)? Patient does not have SCP

## 2023-10-10 ENCOUNTER — OFFICE VISIT (OUTPATIENT)
Dept: MEDICAL GROUP | Facility: LAB | Age: 48
End: 2023-10-10
Payer: COMMERCIAL

## 2023-10-10 ENCOUNTER — HOSPITAL ENCOUNTER (OUTPATIENT)
Dept: LAB | Facility: MEDICAL CENTER | Age: 48
End: 2023-10-10
Attending: PHYSICIAN ASSISTANT
Payer: COMMERCIAL

## 2023-10-10 VITALS
RESPIRATION RATE: 16 BRPM | DIASTOLIC BLOOD PRESSURE: 78 MMHG | TEMPERATURE: 97.1 F | OXYGEN SATURATION: 96 % | BODY MASS INDEX: 30.67 KG/M2 | SYSTOLIC BLOOD PRESSURE: 128 MMHG | HEART RATE: 86 BPM | HEIGHT: 75 IN | WEIGHT: 246.69 LBS

## 2023-10-10 DIAGNOSIS — R42 DIZZINESS: ICD-10-CM

## 2023-10-10 DIAGNOSIS — H53.9 VISION CHANGES: ICD-10-CM

## 2023-10-10 DIAGNOSIS — E29.1 HYPOTESTOSTERONEMIA IN MALE: ICD-10-CM

## 2023-10-10 DIAGNOSIS — Z13.21 ENCOUNTER FOR VITAMIN DEFICIENCY SCREENING: ICD-10-CM

## 2023-10-10 DIAGNOSIS — I10 ESSENTIAL HYPERTENSION: ICD-10-CM

## 2023-10-10 DIAGNOSIS — L98.9 SKIN LESION: ICD-10-CM

## 2023-10-10 DIAGNOSIS — R68.89 EXERCISE INTOLERANCE: ICD-10-CM

## 2023-10-10 LAB
ALBUMIN SERPL BCP-MCNC: 4.6 G/DL (ref 3.2–4.9)
ALBUMIN/GLOB SERPL: 1.8 G/DL
ALP SERPL-CCNC: 73 U/L (ref 30–99)
ALT SERPL-CCNC: 19 U/L (ref 2–50)
ANION GAP SERPL CALC-SCNC: 10 MMOL/L (ref 7–16)
AST SERPL-CCNC: 18 U/L (ref 12–45)
BASOPHILS # BLD AUTO: 0.2 % (ref 0–1.8)
BASOPHILS # BLD: 0.02 K/UL (ref 0–0.12)
BILIRUB SERPL-MCNC: 0.4 MG/DL (ref 0.1–1.5)
BUN SERPL-MCNC: 12 MG/DL (ref 8–22)
CALCIUM ALBUM COR SERPL-MCNC: 9 MG/DL (ref 8.5–10.5)
CALCIUM SERPL-MCNC: 9.5 MG/DL (ref 8.5–10.5)
CHLORIDE SERPL-SCNC: 103 MMOL/L (ref 96–112)
CO2 SERPL-SCNC: 28 MMOL/L (ref 20–33)
CREAT SERPL-MCNC: 0.82 MG/DL (ref 0.5–1.4)
EOSINOPHIL # BLD AUTO: 0.09 K/UL (ref 0–0.51)
EOSINOPHIL NFR BLD: 1 % (ref 0–6.9)
ERYTHROCYTE [DISTWIDTH] IN BLOOD BY AUTOMATED COUNT: 43.8 FL (ref 35.9–50)
EST. AVERAGE GLUCOSE BLD GHB EST-MCNC: 108 MG/DL
FOLATE SERPL-MCNC: 16.6 NG/ML
GFR SERPLBLD CREATININE-BSD FMLA CKD-EPI: 108 ML/MIN/1.73 M 2
GLOBULIN SER CALC-MCNC: 2.6 G/DL (ref 1.9–3.5)
GLUCOSE SERPL-MCNC: 81 MG/DL (ref 65–99)
HBA1C MFR BLD: 5.4 % (ref 4–5.6)
HCT VFR BLD AUTO: 50.5 % (ref 42–52)
HGB BLD-MCNC: 16.8 G/DL (ref 14–18)
IMM GRANULOCYTES # BLD AUTO: 0.03 K/UL (ref 0–0.11)
IMM GRANULOCYTES NFR BLD AUTO: 0.3 % (ref 0–0.9)
LYMPHOCYTES # BLD AUTO: 2.78 K/UL (ref 1–4.8)
LYMPHOCYTES NFR BLD: 31.3 % (ref 22–41)
MCH RBC QN AUTO: 30.9 PG (ref 27–33)
MCHC RBC AUTO-ENTMCNC: 33.3 G/DL (ref 32.3–36.5)
MCV RBC AUTO: 92.8 FL (ref 81.4–97.8)
MONOCYTES # BLD AUTO: 0.68 K/UL (ref 0–0.85)
MONOCYTES NFR BLD AUTO: 7.7 % (ref 0–13.4)
NEUTROPHILS # BLD AUTO: 5.27 K/UL (ref 1.82–7.42)
NEUTROPHILS NFR BLD: 59.5 % (ref 44–72)
NRBC # BLD AUTO: 0 K/UL
NRBC BLD-RTO: 0 /100 WBC (ref 0–0.2)
PLATELET # BLD AUTO: 349 K/UL (ref 164–446)
PMV BLD AUTO: 10.6 FL (ref 9–12.9)
POTASSIUM SERPL-SCNC: 3.8 MMOL/L (ref 3.6–5.5)
PROT SERPL-MCNC: 7.2 G/DL (ref 6–8.2)
RBC # BLD AUTO: 5.44 M/UL (ref 4.7–6.1)
SODIUM SERPL-SCNC: 141 MMOL/L (ref 135–145)
TSH SERPL DL<=0.005 MIU/L-ACNC: 2.61 UIU/ML (ref 0.38–5.33)
VIT B12 SERPL-MCNC: 459 PG/ML (ref 211–911)
WBC # BLD AUTO: 8.9 K/UL (ref 4.8–10.8)

## 2023-10-10 PROCEDURE — 84402 ASSAY OF FREE TESTOSTERONE: CPT

## 2023-10-10 PROCEDURE — 84403 ASSAY OF TOTAL TESTOSTERONE: CPT

## 2023-10-10 PROCEDURE — 84443 ASSAY THYROID STIM HORMONE: CPT

## 2023-10-10 PROCEDURE — 83036 HEMOGLOBIN GLYCOSYLATED A1C: CPT

## 2023-10-10 PROCEDURE — 82607 VITAMIN B-12: CPT

## 2023-10-10 PROCEDURE — 80053 COMPREHEN METABOLIC PANEL: CPT

## 2023-10-10 PROCEDURE — 84270 ASSAY OF SEX HORMONE GLOBUL: CPT

## 2023-10-10 PROCEDURE — 3078F DIAST BP <80 MM HG: CPT | Performed by: PHYSICIAN ASSISTANT

## 2023-10-10 PROCEDURE — 99214 OFFICE O/P EST MOD 30 MIN: CPT | Performed by: PHYSICIAN ASSISTANT

## 2023-10-10 PROCEDURE — 82746 ASSAY OF FOLIC ACID SERUM: CPT

## 2023-10-10 PROCEDURE — 36415 COLL VENOUS BLD VENIPUNCTURE: CPT

## 2023-10-10 PROCEDURE — 3074F SYST BP LT 130 MM HG: CPT | Performed by: PHYSICIAN ASSISTANT

## 2023-10-10 PROCEDURE — 85025 COMPLETE CBC W/AUTO DIFF WBC: CPT

## 2023-10-10 RX ORDER — BUPROPION HYDROCHLORIDE 300 MG/1
300 TABLET ORAL EVERY MORNING
Qty: 90 TABLET | Refills: 3 | Status: SHIPPED | OUTPATIENT
Start: 2023-10-10 | End: 2023-12-11 | Stop reason: SDUPTHER

## 2023-10-10 RX ORDER — PANTOPRAZOLE SODIUM 40 MG/1
40 TABLET, DELAYED RELEASE ORAL DAILY
Qty: 90 TABLET | Refills: 3 | Status: SHIPPED | OUTPATIENT
Start: 2023-10-10 | End: 2023-12-11 | Stop reason: SDUPTHER

## 2023-10-10 RX ORDER — TAMSULOSIN HYDROCHLORIDE 0.4 MG/1
0.4 CAPSULE ORAL
Qty: 90 CAPSULE | Refills: 3 | Status: SHIPPED | OUTPATIENT
Start: 2023-10-10 | End: 2023-12-11 | Stop reason: SDUPTHER

## 2023-10-10 RX ORDER — MECLIZINE HCL 12.5 MG/1
12.5 TABLET ORAL 3 TIMES DAILY PRN
Qty: 30 TABLET | Refills: 0 | Status: SHIPPED | OUTPATIENT
Start: 2023-10-10

## 2023-10-10 ASSESSMENT — FIBROSIS 4 INDEX: FIB4 SCORE: 1.01

## 2023-10-10 NOTE — PROGRESS NOTES
Subjective:     CC: Several concerns    HPI:   Willie here today with     Dizziness  -Developed dizziness shortly after developing headaches 1 month ago  -pt reports with bending over or quickly changing position, especially with standing up.  Symptoms last for 15 to 30 seconds and self resolved     Headaches  -x 1 month  -denies specific triggers  -denies slurred speech, facial drooping, unilateral weakness  -usually behind eyes, pressure sensation   -significant changes in vision, especially in the R eye  -remote hx of concussion, denies any recent falls or injuries  -taking excedrin OTC with some improvement in symptoms     Thank you  -pt reports worsening exercise tolerance x 1 week  -denies new medication, supplements, diet changes  -notices this seems to be more prominent as he becomes due for a testostone injection   -he does endorse chest pressure with walking, improves with rest.  He also endorses occasional claudication  -denies palpitations, syncope, near syncope      ROS:  All ROS negative except for pertinent positives listed above.         Current Outpatient Medications Ordered in Epic   Medication Sig Dispense Refill    buPROPion (WELLBUTRIN XL) 300 MG XL tablet TAKE 1 TABLET BY MOUTH EVERY DAY IN THE MORNING 90 Tablet 0    losartan (COZAAR) 100 MG Tab Take 1 Tablet by mouth every day. 90 Tablet 3    fluoxetine (PROZAC) 40 MG capsule Take 1 Capsule by mouth every day. 90 Capsule 3    tamsulosin (FLOMAX) 0.4 MG capsule Take 1 Capsule by mouth 1/2 hour after breakfast. 90 Capsule 0    hydroCHLOROthiazide (HYDRODIURIL) 12.5 MG tablet Take 1 Tablet by mouth every day. 90 Tablet 3    pantoprazole (PROTONIX) 40 MG Tablet Delayed Response Take 1 Tablet by mouth every day. 90 Tablet 0    fluconazole (DIFLUCAN) 150 MG tablet TAKE 1 TABLET BY MOUTH AT ONSET OF SYMPTOMS, OK TO REPEAT DOSE AFTER 7 DAYS IF SYMPTOMS 2 Tablet 0    Multiple Vitamin (MULTIVITAMIN PO) Take 1 Tablet by mouth every day.      topiramate  "(TOPAMAX) 100 MG Tab Take 100 mg by mouth at bedtime.      Zolpidem Tartrate 3.5 MG SL Tab Take 3.5 mg by mouth at bedtime as needed.      naproxen (NAPROSYN) 500 MG Tab Take 500 mg by mouth 2 times a day with meals.      Azelastine HCl (ASTEPRO) 0.15 % Solution USE 1 SPRAY PER NOSTRIL ONCE PER DAY (Patient not taking: Reported on 10/10/2023) 11 mL 1    clotrimazole-betamethasone (LOTRISONE) 1-0.05 % Cream Apply 1 Application. topically 2 times a day. (Patient not taking: Reported on 10/10/2023) 45 g 0    sildenafil citrate (VIAGRA) 50 MG tablet Take 50 mg by mouth 1 time a day as needed. (Patient not taking: Reported on 10/10/2023)       No current [facility]-ordered facility-administered medications on file.       Objective:     Exam:  /78 (BP Location: Right arm, Patient Position: Sitting, BP Cuff Size: Adult)   Pulse 86   Temp 36.2 °C (97.1 °F) (Temporal)   Resp 16   Ht 1.905 m (6' 3\")   Wt 112 kg (246 lb 11.1 oz)   SpO2 96%   BMI 30.83 kg/m²  Body mass index is 30.83 kg/m².    Gen: Alert and oriented, No apparent distress.  Neck: Neck is supple without lymphadenopathy.  Lungs: Normal effort, CTA bilaterally, no wheezes, rhonchi, or rales  CV: Regular rate and rhythm. No murmurs, rubs, or gallops.  Ext: No clubbing, cyanosis, edema.  ABD: Soft, nontender, nondistended, bowel sounds present in all 4 quadrants, Moe sign negative, no palpable masses, no rebound tenderness Hesperus-Hallpike positive on right side        Assessment & Plan:     48 y.o. male with the following -     1. Vision changes  2. Dizziness  New onset dizziness, vision changes, headaches with uncertain etiology.  Patient denies history of migraine disorder.  No previous significant neurological history.  No previous neurology work-up.  We will proceed with MRI imaging of the brain given the above symptoms as well as some labs for further evaluation.  Hesperus-Hallpike maneuver is positive on right side so the patient's dizziness component may be " attributable to be BPPV, will also trial short course of meclizine for this.  Discussed red flags indications for ER  - CBC WITH DIFFERENTIAL; Future  - Comp Metabolic Panel; Future  - TSH WITH REFLEX TO FT4; Future  - HEMOGLOBIN A1C; Future  - MR-BRAIN-W/O; Future  - NM-CARDIAC TREADMILL ONLY-NO IMAGING; Future  - meclizine (ANTIVERT) 12.5 MG Tab; Take 1 Tablet by mouth 3 times a day as needed for Dizziness.  Dispense: 30 Tablet; Refill: 0    3. Exercise intolerance  New onset exercise intolerance with claudication, shortness of breath.  Past medical history significant for hypertension, sleep apnea, previous tobacco use as well as testosterone supplementation.  Discussed red flags indications for ER  - NM-CARDIAC TREADMILL ONLY-NO IMAGING; Future    4. Essential hypertension  Chronic condition, stable  Blood pressure at goal today in clinic, continue current medications  - CBC WITH DIFFERENTIAL; Future  - Comp Metabolic Panel; Future    5. Skin lesion  Referral to dermatology for skin cancer screening  - Referral to Dermatology    6. Encounter for vitamin deficiency screening  - VIT B12,  FOLIC ACID    7. Hypotestosteronemia in male  Chronic condition, due for updated labs  - Testosterone, Free & Total, Adult Male (w/SHBG); Future      I spent a total of 33 minutes with record review, exam, communication with the patient, communication with other providers, and documentation of this encounter.      No follow-ups on file.    Please note that this dictation was created using voice recognition software. I have made every reasonable attempt to correct obvious errors, but there may be errors of grammar and possibly content that I did not discover before finalizing the note.

## 2023-10-12 LAB
SHBG SERPL-SCNC: 15 NMOL/L (ref 17–56)
TESTOST FREE MFR SERPL: 2.8 % (ref 1.6–2.9)
TESTOST FREE SERPL-MCNC: 270 PG/ML (ref 47–244)
TESTOST SERPL-MCNC: 961 NG/DL (ref 300–890)

## 2023-10-16 ENCOUNTER — PATIENT MESSAGE (OUTPATIENT)
Dept: MEDICAL GROUP | Facility: LAB | Age: 48
End: 2023-10-16
Payer: COMMERCIAL

## 2023-10-16 DIAGNOSIS — E29.1 HYPOTESTOSTERONEMIA IN MALE: ICD-10-CM

## 2023-11-06 ENCOUNTER — APPOINTMENT (OUTPATIENT)
Dept: RADIOLOGY | Facility: MEDICAL CENTER | Age: 48
End: 2023-11-06
Attending: PHYSICIAN ASSISTANT
Payer: COMMERCIAL

## 2023-11-08 ENCOUNTER — PATIENT MESSAGE (OUTPATIENT)
Dept: MEDICAL GROUP | Facility: LAB | Age: 48
End: 2023-11-08
Payer: COMMERCIAL

## 2023-11-09 DIAGNOSIS — E29.1 HYPOTESTOSTERONEMIA IN MALE: ICD-10-CM

## 2023-11-10 RX ORDER — TESTOSTERONE CYPIONATE 200 MG/ML
INJECTION, SOLUTION INTRAMUSCULAR
Qty: 2 ML | Refills: 0 | Status: SHIPPED | OUTPATIENT
Start: 2023-11-10 | End: 2023-12-11 | Stop reason: SDUPTHER

## 2023-11-14 ENCOUNTER — HOSPITAL ENCOUNTER (OUTPATIENT)
Dept: LAB | Facility: MEDICAL CENTER | Age: 48
End: 2023-11-14
Attending: PHYSICIAN ASSISTANT
Payer: COMMERCIAL

## 2023-11-14 ENCOUNTER — OFFICE VISIT (OUTPATIENT)
Dept: MEDICAL GROUP | Facility: LAB | Age: 48
End: 2023-11-14
Payer: COMMERCIAL

## 2023-11-14 VITALS
RESPIRATION RATE: 16 BRPM | DIASTOLIC BLOOD PRESSURE: 68 MMHG | WEIGHT: 235.89 LBS | TEMPERATURE: 96.3 F | SYSTOLIC BLOOD PRESSURE: 128 MMHG | HEIGHT: 75 IN | BODY MASS INDEX: 29.33 KG/M2 | HEART RATE: 81 BPM | OXYGEN SATURATION: 96 %

## 2023-11-14 DIAGNOSIS — R53.83 OTHER FATIGUE: ICD-10-CM

## 2023-11-14 DIAGNOSIS — R05.3 CHRONIC COUGH: ICD-10-CM

## 2023-11-14 LAB
ALBUMIN SERPL BCP-MCNC: 4.3 G/DL (ref 3.2–4.9)
ALBUMIN/GLOB SERPL: 1.3 G/DL
ALP SERPL-CCNC: 85 U/L (ref 30–99)
ALT SERPL-CCNC: 67 U/L (ref 2–50)
ANION GAP SERPL CALC-SCNC: 12 MMOL/L (ref 7–16)
APPEARANCE UR: CLEAR
AST SERPL-CCNC: 64 U/L (ref 12–45)
BACTERIA #/AREA URNS HPF: ABNORMAL /HPF
BASOPHILS # BLD AUTO: 0.1 % (ref 0–1.8)
BASOPHILS # BLD: 0.01 K/UL (ref 0–0.12)
BILIRUB SERPL-MCNC: 0.4 MG/DL (ref 0.1–1.5)
BILIRUB UR QL STRIP.AUTO: NEGATIVE
BUN SERPL-MCNC: 16 MG/DL (ref 8–22)
CALCIUM ALBUM COR SERPL-MCNC: 9.1 MG/DL (ref 8.5–10.5)
CALCIUM SERPL-MCNC: 9.3 MG/DL (ref 8.5–10.5)
CHLORIDE SERPL-SCNC: 106 MMOL/L (ref 96–112)
CO2 SERPL-SCNC: 23 MMOL/L (ref 20–33)
COLOR UR: YELLOW
CREAT SERPL-MCNC: 0.7 MG/DL (ref 0.5–1.4)
CRP SERPL HS-MCNC: 1.49 MG/DL (ref 0–0.75)
EOSINOPHIL # BLD AUTO: 0.03 K/UL (ref 0–0.51)
EOSINOPHIL NFR BLD: 0.4 % (ref 0–6.9)
EPI CELLS #/AREA URNS HPF: NEGATIVE /HPF
ERYTHROCYTE [DISTWIDTH] IN BLOOD BY AUTOMATED COUNT: 40 FL (ref 35.9–50)
ERYTHROCYTE [SEDIMENTATION RATE] IN BLOOD BY WESTERGREN METHOD: 7 MM/HOUR (ref 0–20)
GFR SERPLBLD CREATININE-BSD FMLA CKD-EPI: 113 ML/MIN/1.73 M 2
GLOBULIN SER CALC-MCNC: 3.2 G/DL (ref 1.9–3.5)
GLUCOSE SERPL-MCNC: 91 MG/DL (ref 65–99)
GLUCOSE UR STRIP.AUTO-MCNC: NEGATIVE MG/DL
HCT VFR BLD AUTO: 48.4 % (ref 42–52)
HGB BLD-MCNC: 16.7 G/DL (ref 14–18)
HYALINE CASTS #/AREA URNS LPF: ABNORMAL /LPF
IMM GRANULOCYTES # BLD AUTO: 0.02 K/UL (ref 0–0.11)
IMM GRANULOCYTES NFR BLD AUTO: 0.3 % (ref 0–0.9)
KETONES UR STRIP.AUTO-MCNC: NEGATIVE MG/DL
LEUKOCYTE ESTERASE UR QL STRIP.AUTO: NEGATIVE
LYMPHOCYTES # BLD AUTO: 1.98 K/UL (ref 1–4.8)
LYMPHOCYTES NFR BLD: 28.2 % (ref 22–41)
MCH RBC QN AUTO: 30.6 PG (ref 27–33)
MCHC RBC AUTO-ENTMCNC: 34.5 G/DL (ref 32.3–36.5)
MCV RBC AUTO: 88.6 FL (ref 81.4–97.8)
MICRO URNS: ABNORMAL
MONOCYTES # BLD AUTO: 0.51 K/UL (ref 0–0.85)
MONOCYTES NFR BLD AUTO: 7.3 % (ref 0–13.4)
NEUTROPHILS # BLD AUTO: 4.48 K/UL (ref 1.82–7.42)
NEUTROPHILS NFR BLD: 63.7 % (ref 44–72)
NITRITE UR QL STRIP.AUTO: NEGATIVE
NRBC # BLD AUTO: 0 K/UL
NRBC BLD-RTO: 0 /100 WBC (ref 0–0.2)
PH UR STRIP.AUTO: 7.5 [PH] (ref 5–8)
PLATELET # BLD AUTO: 266 K/UL (ref 164–446)
PMV BLD AUTO: 10.4 FL (ref 9–12.9)
POTASSIUM SERPL-SCNC: 4.3 MMOL/L (ref 3.6–5.5)
PROT SERPL-MCNC: 7.5 G/DL (ref 6–8.2)
PROT UR QL STRIP: 30 MG/DL
RBC # BLD AUTO: 5.46 M/UL (ref 4.7–6.1)
RBC # URNS HPF: ABNORMAL /HPF
RBC UR QL AUTO: NEGATIVE
SODIUM SERPL-SCNC: 141 MMOL/L (ref 135–145)
SP GR UR STRIP.AUTO: 1.02
UROBILINOGEN UR STRIP.AUTO-MCNC: 0.2 MG/DL
WBC # BLD AUTO: 7 K/UL (ref 4.8–10.8)
WBC #/AREA URNS HPF: ABNORMAL /HPF

## 2023-11-14 PROCEDURE — 3074F SYST BP LT 130 MM HG: CPT | Performed by: PHYSICIAN ASSISTANT

## 2023-11-14 PROCEDURE — 85652 RBC SED RATE AUTOMATED: CPT

## 2023-11-14 PROCEDURE — 80053 COMPREHEN METABOLIC PANEL: CPT

## 2023-11-14 PROCEDURE — 81001 URINALYSIS AUTO W/SCOPE: CPT

## 2023-11-14 PROCEDURE — 85025 COMPLETE CBC W/AUTO DIFF WBC: CPT

## 2023-11-14 PROCEDURE — 3078F DIAST BP <80 MM HG: CPT | Performed by: PHYSICIAN ASSISTANT

## 2023-11-14 PROCEDURE — 99214 OFFICE O/P EST MOD 30 MIN: CPT | Performed by: PHYSICIAN ASSISTANT

## 2023-11-14 PROCEDURE — 86140 C-REACTIVE PROTEIN: CPT

## 2023-11-14 RX ORDER — ALBUTEROL SULFATE 90 UG/1
2 AEROSOL, METERED RESPIRATORY (INHALATION) EVERY 4 HOURS PRN
Qty: 1 EACH | Refills: 0 | Status: SHIPPED | OUTPATIENT
Start: 2023-11-14

## 2023-11-14 RX ORDER — METHYLPREDNISOLONE 4 MG/1
TABLET ORAL
Qty: 21 TABLET | Refills: 0 | Status: SHIPPED | OUTPATIENT
Start: 2023-11-14

## 2023-11-14 ASSESSMENT — FIBROSIS 4 INDEX: FIB4 SCORE: 0.57

## 2023-11-14 NOTE — PROGRESS NOTES
Subjective:     CC: Fatigue, upper respiratory symptoms    HPI:   Willie here today with     Pt reports starting Friday,11/03/2023 while on a cruise, patient developed diarrhea/upset stomach.  The following Monday he also developed a cough, headache, eye pressure.  On Tuesday, 11/7/2023 patient was evaluated urgent care in [place] for fever, cough, chills.  He tested negative for COVID and influenza but was advised to go to the ER.  Patient was subsequently evaluated the ER, previous records are not available the patient was treated for double ear infection with course of azithromycin.    Patient since completed medication, at this time he reports fatigue, body aches, headache, sinus pressure, decreased appetite, poor fluid intake.  He does feel like the cough and sinus pressure have improved but are still bothersome.  Patient's primary complaint today is fatigue and body aches      ROS:  ROS negative except as symptoms indicated above    Current Outpatient Medications Ordered in Epic   Medication Sig Dispense Refill    albuterol 108 (90 Base) MCG/ACT Aero Soln inhalation aerosol Inhale 2 Puffs every four hours as needed for Shortness of Breath. 1 Each 0    methylPREDNISolone (MEDROL DOSEPAK) 4 MG Tablet Therapy Pack As directed on the packaging label. 21 Tablet 0    testosterone cypionate (DEPO-TESTOSTERONE) 200 MG/ML injection INJECT 0.75ML INTO THE MUSCLE EVERY 14 DAYS **SINGLE USE VIAL. DISCARD AFTER FIRST PUNCTURE** 2 mL 0    buPROPion (WELLBUTRIN XL) 300 MG XL tablet Take 1 Tablet by mouth every morning. 90 Tablet 3    pantoprazole (PROTONIX) 40 MG Tablet Delayed Response Take 1 Tablet by mouth every day. 90 Tablet 3    losartan (COZAAR) 100 MG Tab Take 1 Tablet by mouth every day. 90 Tablet 3    fluoxetine (PROZAC) 40 MG capsule Take 1 Capsule by mouth every day. 90 Capsule 3    hydroCHLOROthiazide (HYDRODIURIL) 12.5 MG tablet Take 1 Tablet by mouth every day. 90 Tablet 3    fluconazole (DIFLUCAN) 150 MG  "tablet TAKE 1 TABLET BY MOUTH AT ONSET OF SYMPTOMS, OK TO REPEAT DOSE AFTER 7 DAYS IF SYMPTOMS 2 Tablet 0    topiramate (TOPAMAX) 100 MG Tab Take 100 mg by mouth at bedtime.      Zolpidem Tartrate 3.5 MG SL Tab Take 3.5 mg by mouth at bedtime as needed.      meclizine (ANTIVERT) 12.5 MG Tab Take 1 Tablet by mouth 3 times a day as needed for Dizziness. (Patient not taking: Reported on 11/14/2023) 30 Tablet 0    tamsulosin (FLOMAX) 0.4 MG capsule Take 1 Capsule by mouth 1/2 hour after breakfast. (Patient not taking: Reported on 11/14/2023) 90 Capsule 3    Azelastine HCl (ASTEPRO) 0.15 % Solution USE 1 SPRAY PER NOSTRIL ONCE PER DAY (Patient not taking: Reported on 10/10/2023) 11 mL 1    clotrimazole-betamethasone (LOTRISONE) 1-0.05 % Cream Apply 1 Application. topically 2 times a day. (Patient not taking: Reported on 10/10/2023) 45 g 0    Multiple Vitamin (MULTIVITAMIN PO) Take 1 Tablet by mouth every day. (Patient not taking: Reported on 11/14/2023)      naproxen (NAPROSYN) 500 MG Tab Take 500 mg by mouth 2 times a day with meals. (Patient not taking: Reported on 11/14/2023)      sildenafil citrate (VIAGRA) 50 MG tablet Take 50 mg by mouth 1 time a day as needed. (Patient not taking: Reported on 10/10/2023)       No current [facility]-ordered facility-administered medications on file.         Objective:     Exam:  /68 (BP Location: Left arm, Patient Position: Sitting, BP Cuff Size: Adult)   Pulse 81   Temp (!) 35.7 °C (96.3 °F) (Temporal)   Resp 16   Ht 1.905 m (6' 3\")   Wt 107 kg (235 lb 14.3 oz)   SpO2 96%   BMI 29.48 kg/m²  Body mass index is 29.48 kg/m².    Gen: Alert and oriented, No apparent distress.  HEENT: pupils PERRLA, The pinna, tragus, and ear canal are non-tender and without swelling. The ear canal is clear without discharge. The tympanic membrane is normal in appearance with a good cone of light. Oral mucosa is pink and moist with good dentition. Tongue normal in appearance without lesions and " with good symmetrical movement. No buccal nodules or lesions are noted. The pharynx is normal in appearance without tonsillar swelling or exudates.   Neck: Neck is supple without lymphadenopathy.  Lungs: Normal effort, CTA bilaterally, no wheezes, rhonchi, or rales  CV: Regular rate and rhythm. No murmurs, rubs, or gallops.  Ext: No clubbing, cyanosis, edema.    Assessment & Plan:     48 y.o. male with the following -     1. Other fatigue  Acute condition  Likely secondary to acute viral illness and suspected mild dehydration.  We will check labs for electrolyte abnormalities as well as inflammatory markers.  Advised patient to significantly increase fluid intake and advance diet as tolerated.  He does have Zofran available from the urgent care to utilize for nausea  - CBC WITH DIFFERENTIAL; Future  - Comp Metabolic Panel; Future  - Sed Rate; Future  - CRP QUANTITIVE (NON-CARDIAC); Future  - URINALYSIS,CULTURE IF INDICATED; Future    2. Chronic cough  Previously completed course of azithromycin with improvement in symptoms although patient does still have a lingering cough which is bothersome.  We will add on trial of steroids as well as chest x-ray to evaluate for additional pulmonary etiology  - DX-CHEST-2 VIEWS; Future        I spent a total of 25 minutes with record review, exam, communication with the patient, communication with other providers, and documentation of this encounter.      No follow-ups on file.    Please note that this dictation was created using voice recognition software. I have made every reasonable attempt to correct obvious errors, but there may be errors of grammar and possibly content that I did not discover before finalizing the note.

## 2023-11-14 NOTE — LETTER
[facility]  Litzy Gastelum P.A.-C.  [address]  [place] [address]  Fax: [phone]   Authorization for Release/Disclosure of   Protected Health Information   Name: WILLIE PINEDA : 1975 SSN: [id number]   Address: [facility] [address]  [facility] [address] Phone:    [phone] (home)    I authorize the entity listed below to release/disclose the PHI below to:   [facility]/Litzy Gastelum P.A.-C. and Litzy Gastelum P.A.-C.   Provider or Entity Name:  [facility]   Address   City, [facility], [facility]   Phone:      Fax:     Reason for request: continuity of care   Information to be released:    [  ] LAST COLONOSCOPY,  including any PATH REPORT and follow-up  [  ] LAST FIT/COLOGUARD RESULT [  ] LAST DEXA  [  ] LAST MAMMOGRAM  [  ] LAST PAP  [  ] LAST LABS [  ] RETINA EXAM REPORT  [  ] IMMUNIZATION RECORDS  [ x ] Release all info      [  ] Check here and initial the line next to each item to release ALL health information INCLUDING  _____ Care and treatment for drug and / or alcohol abuse  _____ HIV testing, infection status, or AIDS  _____ Genetic Testing    DATES OF SERVICE OR TIME PERIOD TO BE DISCLOSED: _____________  I understand and acknowledge that:  * This Authorization may be revoked at any time by you in writing, except if your health information has already been used or disclosed.  * Your health information that will be used or disclosed as a result of you signing this authorization could be re-disclosed by the recipient. If this occurs, your re-disclosed health information may no longer be protected by State or Federal laws.  * You may refuse to sign this Authorization. Your refusal will not affect your ability to obtain treatment.  * This Authorization becomes effective upon signing and will  on (date) __________.      If no date is indicated, this Authorization will  one (1) year from the signature date.    Name: Willie Salinas  Sarah  Signature: Date:   11/14/2023     PLEASE FAX REQUESTED RECORDS BACK TO: [phone]

## 2023-11-28 ENCOUNTER — HOSPITAL ENCOUNTER (OUTPATIENT)
Dept: RADIOLOGY | Facility: MEDICAL CENTER | Age: 48
End: 2023-11-28
Attending: PHYSICIAN ASSISTANT
Payer: COMMERCIAL

## 2023-11-28 DIAGNOSIS — R05.3 CHRONIC COUGH: ICD-10-CM

## 2023-11-28 DIAGNOSIS — H53.9 VISION CHANGES: ICD-10-CM

## 2023-11-28 DIAGNOSIS — R42 DIZZINESS: ICD-10-CM

## 2023-11-28 PROCEDURE — 71046 X-RAY EXAM CHEST 2 VIEWS: CPT

## 2023-11-28 PROCEDURE — 70551 MRI BRAIN STEM W/O DYE: CPT

## 2023-11-30 ENCOUNTER — APPOINTMENT (OUTPATIENT)
Dept: RADIOLOGY | Facility: MEDICAL CENTER | Age: 48
End: 2023-11-30
Attending: PHYSICIAN ASSISTANT
Payer: COMMERCIAL

## 2023-12-11 DIAGNOSIS — I10 ESSENTIAL HYPERTENSION: ICD-10-CM

## 2023-12-11 DIAGNOSIS — J32.0 CHRONIC MAXILLARY SINUSITIS: ICD-10-CM

## 2023-12-11 DIAGNOSIS — J30.2 SEASONAL ALLERGIES: ICD-10-CM

## 2023-12-11 DIAGNOSIS — F33.0 MILD EPISODE OF RECURRENT MAJOR DEPRESSIVE DISORDER (HCC): ICD-10-CM

## 2023-12-11 DIAGNOSIS — E29.1 HYPOTESTOSTERONEMIA IN MALE: ICD-10-CM

## 2023-12-11 RX ORDER — HYDROCHLOROTHIAZIDE 12.5 MG/1
12.5 TABLET ORAL DAILY
Qty: 90 TABLET | Refills: 3 | Status: SHIPPED | OUTPATIENT
Start: 2023-12-11

## 2023-12-11 RX ORDER — PANTOPRAZOLE SODIUM 40 MG/1
40 TABLET, DELAYED RELEASE ORAL DAILY
Qty: 90 TABLET | Refills: 3 | Status: SHIPPED | OUTPATIENT
Start: 2023-12-11

## 2023-12-11 RX ORDER — BUPROPION HYDROCHLORIDE 300 MG/1
300 TABLET ORAL EVERY MORNING
Qty: 90 TABLET | Refills: 3 | Status: SHIPPED | OUTPATIENT
Start: 2023-12-11

## 2023-12-11 RX ORDER — AZELASTINE HCL 205.5 UG/1
SPRAY NASAL
Qty: 11 ML | Refills: 1 | Status: SHIPPED | OUTPATIENT
Start: 2023-12-11 | End: 2023-12-12

## 2023-12-11 RX ORDER — TESTOSTERONE CYPIONATE 200 MG/ML
INJECTION, SOLUTION INTRAMUSCULAR
Qty: 2 ML | Refills: 0 | Status: SHIPPED | OUTPATIENT
Start: 2023-12-11 | End: 2024-01-22 | Stop reason: SDUPTHER

## 2023-12-11 RX ORDER — FLUOXETINE HYDROCHLORIDE 40 MG/1
40 CAPSULE ORAL DAILY
Qty: 90 CAPSULE | Refills: 3 | Status: SHIPPED | OUTPATIENT
Start: 2023-12-11

## 2023-12-11 RX ORDER — FLUCONAZOLE 150 MG/1
TABLET ORAL
Qty: 2 TABLET | Refills: 0 | Status: SHIPPED | OUTPATIENT
Start: 2023-12-11 | End: 2023-12-12

## 2023-12-11 RX ORDER — TAMSULOSIN HYDROCHLORIDE 0.4 MG/1
0.4 CAPSULE ORAL
Qty: 90 CAPSULE | Refills: 3 | Status: SHIPPED | OUTPATIENT
Start: 2023-12-11 | End: 2024-01-22 | Stop reason: SDUPTHER

## 2023-12-11 RX ORDER — LOSARTAN POTASSIUM 100 MG/1
100 TABLET ORAL
Qty: 90 TABLET | Refills: 3 | Status: SHIPPED | OUTPATIENT
Start: 2023-12-11

## 2023-12-12 DIAGNOSIS — J30.2 SEASONAL ALLERGIES: ICD-10-CM

## 2023-12-12 DIAGNOSIS — J32.0 CHRONIC MAXILLARY SINUSITIS: ICD-10-CM

## 2023-12-12 RX ORDER — FLUCONAZOLE 150 MG/1
TABLET ORAL
Qty: 2 TABLET | Refills: 0 | Status: SHIPPED | OUTPATIENT
Start: 2023-12-12

## 2023-12-12 RX ORDER — AZELASTINE 1 MG/ML
1 SPRAY, METERED NASAL 2 TIMES DAILY
Qty: 30 ML | Refills: 3 | Status: SHIPPED | OUTPATIENT
Start: 2023-12-12 | End: 2024-01-22 | Stop reason: SDUPTHER

## 2024-01-22 ENCOUNTER — PATIENT MESSAGE (OUTPATIENT)
Dept: MEDICAL GROUP | Facility: LAB | Age: 49
End: 2024-01-22
Payer: COMMERCIAL

## 2024-01-22 DIAGNOSIS — E29.1 HYPOTESTOSTERONEMIA IN MALE: ICD-10-CM

## 2024-01-22 DIAGNOSIS — G47.00 INSOMNIA, UNSPECIFIED TYPE: ICD-10-CM

## 2024-01-22 DIAGNOSIS — J30.2 SEASONAL ALLERGIES: ICD-10-CM

## 2024-01-23 DIAGNOSIS — J30.2 SEASONAL ALLERGIES: ICD-10-CM

## 2024-01-23 RX ORDER — TESTOSTERONE CYPIONATE 200 MG/ML
INJECTION, SOLUTION INTRAMUSCULAR
Qty: 2 ML | Refills: 0 | Status: SHIPPED | OUTPATIENT
Start: 2024-01-23 | End: 2024-02-15 | Stop reason: SDUPTHER

## 2024-01-23 RX ORDER — TAMSULOSIN HYDROCHLORIDE 0.4 MG/1
0.4 CAPSULE ORAL
Qty: 90 CAPSULE | Refills: 3 | Status: SHIPPED | OUTPATIENT
Start: 2024-01-23

## 2024-01-23 RX ORDER — ZOLPIDEM TARTRATE 3.5 MG/1
3.5 TABLET SUBLINGUAL
Qty: 30 TABLET | Refills: 0 | Status: SHIPPED | OUTPATIENT
Start: 2024-01-23 | End: 2024-02-15 | Stop reason: SDUPTHER

## 2024-01-23 RX ORDER — AZELASTINE 1 MG/ML
1 SPRAY, METERED NASAL 2 TIMES DAILY
Qty: 30 ML | Refills: 3 | Status: SHIPPED | OUTPATIENT
Start: 2024-01-23 | End: 2024-01-23

## 2024-01-23 RX ORDER — NAPROXEN 500 MG/1
500 TABLET ORAL 2 TIMES DAILY WITH MEALS
Qty: 60 TABLET | Refills: 0 | Status: SHIPPED | OUTPATIENT
Start: 2024-01-23

## 2024-01-23 RX ORDER — AZELASTINE 1 MG/ML
1 SPRAY, METERED NASAL DAILY
Qty: 30 ML | Refills: 3 | Status: SHIPPED | OUTPATIENT
Start: 2024-01-23

## 2024-01-23 RX ORDER — TOPIRAMATE 100 MG/1
100 TABLET, FILM COATED ORAL
Qty: 60 TABLET | Refills: 0 | Status: SHIPPED | OUTPATIENT
Start: 2024-01-23 | End: 2024-02-15 | Stop reason: SDUPTHER

## 2024-01-23 NOTE — TELEPHONE ENCOUNTER
Received request via: Pharmacy    Was the patient seen in the last year in this department? Yes    Does the patient have an active prescription (recently filled or refills available) for medication(s) requested? No    [facility]    Does the patient have FDC Plus and need 100 day supply (blood pressure, diabetes and cholesterol meds only)? Patient does not have SCP

## 2024-01-23 NOTE — TELEPHONE ENCOUNTER
Received request via: Patient    Was the patient seen in the last year in this department? Yes    Does the patient have an active prescription (recently filled or refills available) for medication(s) requested? No    [facility]    Does the patient have USP Plus and need 100 day supply (blood pressure, diabetes and cholesterol meds only)? Patient does not have SCP

## 2024-01-23 NOTE — TELEPHONE ENCOUNTER
Received request via: Patient    Was the patient seen in the last year in this department? Yes    Does the patient have an active prescription (recently filled or refills available) for medication(s) requested? No    [facility]    Does the patient have retirement Plus and need 100 day supply (blood pressure, diabetes and cholesterol meds only)? Patient does not have SCP

## 2024-01-24 ENCOUNTER — TELEPHONE (OUTPATIENT)
Dept: MEDICAL GROUP | Facility: LAB | Age: 49
End: 2024-01-24

## 2024-01-24 NOTE — TELEPHONE ENCOUNTER
DOCUMENTATION OF PAR STATUS:    1. Name of Medication & Dose: Zolpidem Tartrate 3.5MG sublingual tablets     2. Name of Prescription Coverage Company & phone #: [employer]    3. Date Prior Auth Submitted: 1/24/2024    5. Prior Auth Status? Pending    6. Patient Notified: no

## 2024-02-15 DIAGNOSIS — E29.1 HYPOTESTOSTERONEMIA IN MALE: ICD-10-CM

## 2024-02-15 DIAGNOSIS — G47.00 INSOMNIA, UNSPECIFIED TYPE: ICD-10-CM

## 2024-02-16 RX ORDER — TOPIRAMATE 100 MG/1
100 TABLET, FILM COATED ORAL
Qty: 90 TABLET | Refills: 3 | Status: SHIPPED | OUTPATIENT
Start: 2024-02-16

## 2024-02-16 RX ORDER — TESTOSTERONE CYPIONATE 200 MG/ML
INJECTION, SOLUTION INTRAMUSCULAR
Qty: 2 ML | Refills: 0 | Status: SHIPPED | OUTPATIENT
Start: 2024-02-16 | End: 2024-03-15

## 2024-02-16 RX ORDER — ZOLPIDEM TARTRATE 3.5 MG/1
3.5 TABLET SUBLINGUAL
Qty: 30 TABLET | Refills: 0 | Status: SHIPPED | OUTPATIENT
Start: 2024-02-16 | End: 2024-03-17

## 2024-02-16 NOTE — TELEPHONE ENCOUNTER
Received request via: Patient    Was the patient seen in the last year in this department? Yes    Does the patient have an active prescription (recently filled or refills available) for medication(s) requested? No    [facility]    Does the patient have long-term Plus and need 100 day supply (blood pressure, diabetes and cholesterol meds only)? Patient does not have SCP

## 2024-02-16 NOTE — TELEPHONE ENCOUNTER
Received request via: Patient    Was the patient seen in the last year in this department? Yes    Does the patient have an active prescription (recently filled or refills available) for medication(s) requested? No    [facility]    Does the patient have alf Plus and need 100 day supply (blood pressure, diabetes and cholesterol meds only)? Patient does not have SCP

## 2024-02-16 NOTE — TELEPHONE ENCOUNTER
Received request via: Patient    Was the patient seen in the last year in this department? Yes    Does the patient have an active prescription (recently filled or refills available) for medication(s) requested? No    [facility]    Does the patient have intermediate Plus and need 100 day supply (blood pressure, diabetes and cholesterol meds only)? Patient does not have SCP

## 2024-02-23 DIAGNOSIS — E29.1 HYPOTESTOSTERONEMIA IN MALE: ICD-10-CM

## 2024-02-23 RX ORDER — TESTOSTERONE CYPIONATE 200 MG/ML
INJECTION, SOLUTION INTRAMUSCULAR
Qty: 2 ML | Refills: 0 | OUTPATIENT
Start: 2024-02-23 | End: 2024-03-22

## 2024-03-26 ENCOUNTER — PATIENT MESSAGE (OUTPATIENT)
Dept: MEDICAL GROUP | Facility: LAB | Age: 49
End: 2024-03-26
Payer: COMMERCIAL

## 2024-03-26 NOTE — LETTER
March 26, 2024        Skinny Smith    To Whom it may concern:    The above-named person is established patient of this practice.  He is prescribed the following medications for the following reasons:  HYDROCHLOROTHIAZIDE: Essential hypertension  LOSARTAN: Essential hypertension  BUPROPION: Depression  FLUOXETINE: Depression    Please contact my office if you have any questions                    Litzy Gastelum P.A.-C.

## 2024-04-19 ENCOUNTER — TELEMEDICINE (OUTPATIENT)
Dept: MEDICAL GROUP | Facility: LAB | Age: 49
End: 2024-04-19
Payer: COMMERCIAL

## 2024-04-19 VITALS — BODY MASS INDEX: 29.48 KG/M2 | HEIGHT: 75 IN

## 2024-04-19 DIAGNOSIS — J01.10 ACUTE NON-RECURRENT FRONTAL SINUSITIS: ICD-10-CM

## 2024-04-19 DIAGNOSIS — R79.89 LOW TESTOSTERONE: ICD-10-CM

## 2024-04-19 DIAGNOSIS — G47.00 INSOMNIA, UNSPECIFIED TYPE: ICD-10-CM

## 2024-04-19 DIAGNOSIS — Z13.220 LIPID SCREENING: ICD-10-CM

## 2024-04-19 PROCEDURE — 99214 OFFICE O/P EST MOD 30 MIN: CPT | Mod: 95 | Performed by: PHYSICIAN ASSISTANT

## 2024-04-19 RX ORDER — NAPROXEN 500 MG/1
500 TABLET ORAL 2 TIMES DAILY WITH MEALS
Qty: 60 TABLET | Refills: 0 | Status: SHIPPED | OUTPATIENT
Start: 2024-04-19

## 2024-04-19 RX ORDER — DIAPER,BRIEF,INFANT-TODD,DISP
EACH MISCELLANEOUS
Qty: 240 EACH | Refills: 0 | Status: CANCELLED | OUTPATIENT
Start: 2024-04-19

## 2024-04-19 RX ORDER — TESTOSTERONE CYPIONATE 200 MG/ML
INJECTION, SOLUTION INTRAMUSCULAR
Qty: 2 ML | Refills: 0 | Status: SHIPPED | OUTPATIENT
Start: 2024-04-19 | End: 2024-05-19

## 2024-04-19 RX ORDER — AMOXICILLIN AND CLAVULANATE POTASSIUM 875; 125 MG/1; MG/1
1 TABLET, FILM COATED ORAL 2 TIMES DAILY
Qty: 10 TABLET | Refills: 0 | Status: SHIPPED | OUTPATIENT
Start: 2024-04-19 | End: 2024-04-24

## 2024-04-19 RX ORDER — ZOLPIDEM TARTRATE 3.5 MG/1
3.5 TABLET SUBLINGUAL
Qty: 90 TABLET | Refills: 0 | Status: SHIPPED | OUTPATIENT
Start: 2024-04-19 | End: 2024-07-18

## 2024-04-19 RX ORDER — NEEDLES, SAFETY 18GX1 1/2"
NEEDLE, DISPOSABLE MISCELLANEOUS
Qty: 50 EACH | Refills: 0 | Status: SHIPPED | OUTPATIENT
Start: 2024-04-19

## 2024-04-19 ASSESSMENT — PATIENT HEALTH QUESTIONNAIRE - PHQ9: CLINICAL INTERPRETATION OF PHQ2 SCORE: 0

## 2024-04-19 NOTE — PROGRESS NOTES
Virtual Visit: Established Patient   This visit was conducted via Zoom using secure and encrypted videoconferencing technology.   The patient was in a private location outside of their home in the state [facility].    The patient's identity was confirmed and verbal consent was obtained for this virtual visit.    Subjective:   CC: No chief complaint on file.    Willie Smith is a 49 y.o. male presenting for evaluation and management of:    1. Insomnia, unspecified type  Chronic condition  -uses ambien sparingly for persistent symptoms   -denies medication side effects, denies aberrant usage  -PDMP reviewed    2. Low testosterone  Chronic Condition  -Due for updated labs including testosterone, CBC    3. Sick x 1 week  Sinus pressure, ear pressure, PND, cough, sore throat  Initially had fever, now resolved  Endorses sick contacts at work and at home - bronchitis, pneumonia, sinusitis  OTC: mucinex, naproxen      ROS   All ROS negative except for pertinent positives listed above.       Current medicines (including changes today)  Current Outpatient Medications   Medication Sig Dispense Refill    topiramate (TOPAMAX) 100 MG Tab Take 1 Tablet by mouth at bedtime. 90 Tablet 3    tamsulosin (FLOMAX) 0.4 MG capsule Take 1 Capsule by mouth 1/2 hour after breakfast. 90 Capsule 3    naproxen (NAPROSYN) 500 MG Tab Take 1 Tablet by mouth 2 times a day with meals. 60 Tablet 0    azelastine (ASTELIN) 137 MCG/SPRAY nasal spray Administer 1 Spray into affected nostril(S) every day. USE 1 SPRAY PER NOSTRIL ONCE PER DAY 30 mL 3    fluconazole (DIFLUCAN) 150 MG tablet TAKE 1 TABLET BY MOUTH AT ONSET OF SYMPTOMS, OK TO REPEAT DOSE AFTER 7 DAYS IF SYMPTOMS persist 2 Tablet 0    losartan (COZAAR) 100 MG Tab Take 1 Tablet by mouth every day. 90 Tablet 3    fluoxetine (PROZAC) 40 MG capsule Take 1 Capsule by mouth every day. 90 Capsule 3    hydroCHLOROthiazide 12.5 MG tablet Take 1 Tablet by mouth every day. 90 Tablet 3     buPROPion (WELLBUTRIN XL) 300 MG XL tablet Take 1 Tablet by mouth every morning. 90 Tablet 3    pantoprazole (PROTONIX) 40 MG Tablet Delayed Response Take 1 Tablet by mouth every day. 90 Tablet 3    albuterol 108 (90 Base) MCG/ACT Aero Soln inhalation aerosol Inhale 2 Puffs every four hours as needed for Shortness of Breath. 1 Each 0    methylPREDNISolone (MEDROL DOSEPAK) 4 MG Tablet Therapy Pack As directed on the packaging label. 21 Tablet 0    meclizine (ANTIVERT) 12.5 MG Tab Take 1 Tablet by mouth 3 times a day as needed for Dizziness. (Patient not taking: Reported on 11/14/2023) 30 Tablet 0    clotrimazole-betamethasone (LOTRISONE) 1-0.05 % Cream Apply 1 Application. topically 2 times a day. (Patient not taking: Reported on 10/10/2023) 45 g 0    Multiple Vitamin (MULTIVITAMIN PO) Take 1 Tablet by mouth every day. (Patient not taking: Reported on 11/14/2023)      sildenafil citrate (VIAGRA) 50 MG tablet Take 50 mg by mouth 1 time a day as needed. (Patient not taking: Reported on 10/10/2023)       No current facility-administered medications for this visit.       Patient Active Problem List    Diagnosis Date Noted    Carpal tunnel syndrome on left 08/01/2022    Chronic bilateral low back pain with bilateral sciatica 03/19/2022    Benign prostatic hyperplasia 03/18/2022    Low testosterone 06/10/2021    Laceration of right foot 03/05/2020    Anxiety disorder 01/13/2020    Tinnitus 12/05/2019    Hypertension 11/25/2019    ADD (attention deficit disorder) without hyperactivity 03/06/2018    Moderate episode of recurrent major depressive disorder (HCC) 03/06/2018    Obstructive sleep apnea syndrome 01/29/2018    Insomnia 01/12/2018    Vitamin D deficiency 11/08/2016    Gastroesophageal reflux disease without esophagitis 10/04/2016    Depression 10/04/2016    Disorder of uvula of palate 10/04/2016    Obese 10/04/2016        Objective:   There were no vitals taken for this visit.    Physical Exam:  Constitutional:  "Alert, no distress, well-groomed.  Skin: No rashes in visible areas.  Eye: Round. Conjunctiva clear, lids normal. No icterus.   ENMT: Lips pink without lesions, good dentition, moist mucous membranes. Phonation normal.  Neck: No masses, no thyromegaly. Moves freely without pain.  Respiratory: Unlabored respiratory effort, no cough or audible wheeze  Psych: Alert and oriented x3, normal affect and mood.     Assessment and Plan:   The following treatment plan was discussed:     1. Insomnia, unspecified type  Chronic condition, stable  Continue current medication.  Patient does use this medication very sparingly  Patient understands this prescription is a controlled substance which is potentially habit-forming and its use is regulated by the [place]. Refills are subject to terms of a controlled substance agreement and patient has an updated one on file. Most recent UDS is appropriate. Any refill requires an office visit. Narcotics may have adverse effects and the risks of addiction, accidental overdose and death were emphasized. Provided prescriptions for the next three months.  - Zolpidem Tartrate 3.5 MG SL Tab; Place 3.5 mg under the tongue at bedtime as needed (insomnia symptoms) for up to 90 days. Indications: Trouble Sleeping  Dispense: 90 Tablet; Refill: 0    2. Low testosterone  Chronic condition, stable  Due for updated lab work.  Patient will need to complete labs prior to additional refills  - testosterone cypionate (DEPO-TESTOSTERONE) 200 MG/ML injection; INJECT 0.75ML INTO THE MUSCLE EVERY 14 DAYS **SINGLE USE VIAL. DISCARD AFTER FIRST PUNCTURE**  Indications: Deficient Activity of the Testis  Dispense: 2 mL; Refill: 0  - NEEDLE, DISP, 25 G (BD ECLIPSE) 25G X 1-1/2\" Misc; Use as directed for testosterone injections  Dispense: 50 Each; Refill: 0  - Testosterone, Free & Total, Adult Male (w/SHBG); Future  - syringe 1 ML Misc; For use as directed for testosterone injections  Dispense: 50 Each; Refill: 0  - CBC " WITH DIFFERENTIAL; Future  - Comp Metabolic Panel; Future  - TSH WITH REFLEX TO FT4; Future  - PROSTATE SPECIFIC AG SCREENING; Future  - Testosterone, Free & Total, Adult Male (w/SHBG); Future    3. Acute non-recurrent frontal sinusitis  Acute condition  Given duration of symptoms and lack of response to more conservative therapy we will proceed with antibiotic treatment.  Instructed patient to complete antibiotic course in its entirety.  He may continue over-the-counter cough and cold medicine as needed.  Discussed red flags and indications for close follow-up  - amoxicillin-clavulanate (AUGMENTIN) 875-125 MG Tab; Take 1 Tablet by mouth 2 times a day for 5 days.  Dispense: 10 Tablet; Refill: 0    4. Lipid screening  - Lipid Profile; Future        Follow-up: No follow-ups on file.

## 2024-06-17 RX ORDER — NAPROXEN 500 MG/1
500 TABLET ORAL 2 TIMES DAILY WITH MEALS
Qty: 60 TABLET | Refills: 0 | Status: SHIPPED | OUTPATIENT
Start: 2024-06-17

## 2024-06-17 NOTE — TELEPHONE ENCOUNTER
Received request via: Pharmacy    Was the patient seen in the last year in this department? Yes    Does the patient have an active prescription (recently filled or refills available) for medication(s) requested? No    [facility]    Does the patient have custodial Plus and need 100 day supply (blood pressure, diabetes and cholesterol meds only)? Patient does not have SCP  
Clear bilaterally, pupils equal, round and reactive to light.

## 2024-06-25 ENCOUNTER — PATIENT MESSAGE (OUTPATIENT)
Dept: MEDICAL GROUP | Facility: LAB | Age: 49
End: 2024-06-25
Payer: COMMERCIAL

## 2024-06-25 DIAGNOSIS — R79.89 LOW TESTOSTERONE: ICD-10-CM

## 2024-06-26 RX ORDER — TESTOSTERONE CYPIONATE 200 MG/ML
INJECTION, SOLUTION INTRAMUSCULAR
Qty: 2 ML | Refills: 0 | Status: SHIPPED | OUTPATIENT
Start: 2024-06-26 | End: 2024-07-25

## 2024-07-19 ENCOUNTER — PATIENT MESSAGE (OUTPATIENT)
Dept: MEDICAL GROUP | Facility: LAB | Age: 49
End: 2024-07-19
Payer: COMMERCIAL

## 2024-07-19 DIAGNOSIS — R79.89 LOW TESTOSTERONE: ICD-10-CM

## 2024-08-05 RX ORDER — TESTOSTERONE CYPIONATE 200 MG/ML
INJECTION, SOLUTION INTRAMUSCULAR
Qty: 2 ML | Refills: 0 | Status: SHIPPED | OUTPATIENT
Start: 2024-08-05 | End: 2024-09-04

## 2024-09-22 DIAGNOSIS — R79.89 LOW TESTOSTERONE: ICD-10-CM

## 2024-09-23 NOTE — TELEPHONE ENCOUNTER
Received request via: Pharmacy    Was the patient seen in the last year in this department? Yes    Does the patient have an active prescription (recently filled or refills available) for medication(s) requested? No    [facility]    Does the patient have senior living Plus and need 100-day supply? (This applies to ALL medications) Patient does not have SCP

## 2024-09-24 ENCOUNTER — TELEMEDICINE (OUTPATIENT)
Dept: MEDICAL GROUP | Facility: LAB | Age: 49
End: 2024-09-24
Payer: COMMERCIAL

## 2024-09-24 VITALS — HEIGHT: 75 IN | BODY MASS INDEX: 29.48 KG/M2

## 2024-09-24 DIAGNOSIS — R79.89 LOW TESTOSTERONE: ICD-10-CM

## 2024-09-24 DIAGNOSIS — Z12.11 COLON CANCER SCREENING: ICD-10-CM

## 2024-09-24 DIAGNOSIS — R25.1 TREMOR OF BOTH HANDS: ICD-10-CM

## 2024-09-24 DIAGNOSIS — G47.00 INSOMNIA, UNSPECIFIED TYPE: ICD-10-CM

## 2024-09-24 DIAGNOSIS — G47.33 OBSTRUCTIVE SLEEP APNEA SYNDROME: ICD-10-CM

## 2024-09-24 PROCEDURE — 99214 OFFICE O/P EST MOD 30 MIN: CPT | Mod: 95 | Performed by: PHYSICIAN ASSISTANT

## 2024-09-24 RX ORDER — NAPROXEN 500 MG/1
500 TABLET ORAL 2 TIMES DAILY WITH MEALS
Qty: 60 TABLET | Refills: 0 | Status: SHIPPED | OUTPATIENT
Start: 2024-09-24

## 2024-09-24 RX ORDER — TESTOSTERONE CYPIONATE 200 MG/ML
INJECTION, SOLUTION INTRAMUSCULAR
Qty: 2 ML | Refills: 0 | Status: SHIPPED | OUTPATIENT
Start: 2024-09-24 | End: 2024-10-24

## 2024-09-24 NOTE — PROGRESS NOTES
Virtual Visit: Established Patient   This visit was conducted via Teams using secure and encrypted videoconferencing technology.   The patient was in their home in the [facility].    The patient's identity was confirmed and verbal consent was obtained for this virtual visit.    Subjective:   CC:   Chief Complaint   Patient presents with    Medication Refill     Willie Smith is a 49 y.o. male presenting for evaluation and management of:    Verbal consent was acquired by the patient to use [employer] ambient listening note generation during this visit Yes     History of Present Illness  The patient presents for evaluation of multiple medical concerns.    Bilateral hand tremors  He has been experiencing hand tremors for approximately 6 months to a year. These tremors are bilateral, more noticeable during rest, and can be controlled with focus. They become more pronounced when he is active or using his hands, such as when holding his phone. Additionally, he reports feeling lightheaded when standing up or bending over. His medication regimen has remained unchanged for several years.    Obstructive sleep apnea  He has been struggling with sleep, often waking up after only 3 to 4 hours. He was previously referred to a sleep medicine specialist but has not yet scheduled an appointment. He uses a CPAP machine, but it is outdated and has been recalled. He takes sleeping tablets twice a week. Last week, he only managed to get 2 hours of sleep one night, resulting in excessive daytime sleepiness and difficulty staying awake while driving.    BPH  He is due for lab work to check for enlarged prostate and plans to have this done at [facility].  Patient is currently following with urology for management of this    Low testosterone   patient is due for updated labs including testosterone level and CBC.  He is currently  Treating his condition with Depo testosterone 200 mg/mL, 0.75 mL every 14 days  Denies any side  "effects or issues with injecting medication at this time      FAMILY HISTORY  He denies any family history of movement disorders that he knows of. He has a family history of Alzheimer's and strokes in his grandmother. He denies any family history of Parkinson's disease that he knows of. His mother has always taken nerve medicine.          ROS   All ROS negative except for pertinent positives listed above.       Current medicines (including changes today)  Current Outpatient Medications   Medication Sig Dispense Refill    naproxen (NAPROSYN) 500 MG Tab TAKE 1 TABLET TWICE DAILY  WITH MEALS 60 Tablet 0    NEEDLE, DISP, 25 G (BD ECLIPSE) 25G X 1-1/2\" Misc Use as directed for testosterone injections 50 Each 0    syringe 1 ML Misc For use as directed for testosterone injections 50 Each 0    topiramate (TOPAMAX) 100 MG Tab Take 1 Tablet by mouth at bedtime. 90 Tablet 3    tamsulosin (FLOMAX) 0.4 MG capsule Take 1 Capsule by mouth 1/2 hour after breakfast. 90 Capsule 3    azelastine (ASTELIN) 137 MCG/SPRAY nasal spray Administer 1 Spray into affected nostril(S) every day. USE 1 SPRAY PER NOSTRIL ONCE PER DAY 30 mL 3    losartan (COZAAR) 100 MG Tab Take 1 Tablet by mouth every day. 90 Tablet 3    fluoxetine (PROZAC) 40 MG capsule Take 1 Capsule by mouth every day. 90 Capsule 3    hydroCHLOROthiazide 12.5 MG tablet Take 1 Tablet by mouth every day. 90 Tablet 3    buPROPion (WELLBUTRIN XL) 300 MG XL tablet Take 1 Tablet by mouth every morning. 90 Tablet 3    pantoprazole (PROTONIX) 40 MG Tablet Delayed Response Take 1 Tablet by mouth every day. 90 Tablet 3    albuterol 108 (90 Base) MCG/ACT Aero Soln inhalation aerosol Inhale 2 Puffs every four hours as needed for Shortness of Breath. 1 Each 0    methylPREDNISolone (MEDROL DOSEPAK) 4 MG Tablet Therapy Pack As directed on the packaging label. 21 Tablet 0     No current facility-administered medications for this visit.       Patient Active Problem List    Diagnosis Date Noted " "   Carpal tunnel syndrome on left 08/01/2022    Chronic bilateral low back pain with bilateral sciatica 03/19/2022    Benign prostatic hyperplasia 03/18/2022    Low testosterone 06/10/2021    Laceration of right foot 03/05/2020    Anxiety disorder 01/13/2020    Tinnitus 12/05/2019    Hypertension 11/25/2019    ADD (attention deficit disorder) without hyperactivity 03/06/2018    Moderate episode of recurrent major depressive disorder (HCC) 03/06/2018    Obstructive sleep apnea syndrome 01/29/2018    Insomnia 01/12/2018    Vitamin D deficiency 11/08/2016    Gastroesophageal reflux disease without esophagitis 10/04/2016    Depression 10/04/2016    Disorder of uvula of palate 10/04/2016    Obese 10/04/2016        Objective:   Ht 1.905 m (6' 3\")   BMI 29.48 kg/m²     Physical Exam:  Constitutional: Alert, no distress, well-groomed.  Skin: No rashes in visible areas.  Eye: Round. Conjunctiva clear, lids normal. No icterus.   ENMT: Lips pink without lesions, good dentition, moist mucous membranes. Phonation normal.  Neck: No masses, no thyromegaly. Moves freely without pain.  Respiratory: Unlabored respiratory effort, no cough or audible wheeze  Psych: Alert and oriented x3, normal affect and mood.     Assessment and Plan:   The following treatment plan was discussed:   This  Assessment & Plan  1. Bilateral hand tremor.  He reports experiencing hand tremors for 6 months to a year, which are more noticeable during activities such as holding a phone or writing. The tremors are present in both hands and are more prominent when he is moving around. There is no known family history of movement disorders, although his mother has taken \"nerve medicine\" for unspecified reasons. Updated lab work will be ordered to assess testosterone levels, complete blood count (CBC), and thyroid function to rule out any underlying causes such as thyroid dysfunction. If all results are within normal limits, a referral to Neurology will be " considered for further evaluation of the tremor.    2. Sleep apnea, fatigue, and insomnia.  He reports chronic sleep issues, including waking up every 3 to 4 hours and feeling tired frequently. He has an old CPAP machine that was recalled. A new referral will be made to a sleep medicine specialist for further management of his sleep apnea, fatigue, and insomnia. He is advised to follow up with the sleep medicine specialist and consider undergoing a sleep study.    3.  Low testosterone  The prescription for his testosterone will be refilled, but updated lab results are required to check blood counts and testosterone levels.  PDMP reviewed    4. Health maintenance.  A colonoscopy has been ordered for routine screening. He is advised that the regular colonoscopy is considered the gold standard and, if clear, will not need to be repeated for 10 years.          Follow-up: No follow-ups on file.

## 2024-09-30 ENCOUNTER — HOSPITAL ENCOUNTER (OUTPATIENT)
Dept: LAB | Facility: MEDICAL CENTER | Age: 49
End: 2024-09-30
Attending: PHYSICIAN ASSISTANT
Payer: COMMERCIAL

## 2024-09-30 DIAGNOSIS — Z13.220 LIPID SCREENING: ICD-10-CM

## 2024-09-30 DIAGNOSIS — R79.89 LOW TESTOSTERONE: ICD-10-CM

## 2024-09-30 LAB
ALBUMIN SERPL BCP-MCNC: 4.4 G/DL (ref 3.2–4.9)
ALBUMIN/GLOB SERPL: 1.6 G/DL
ALP SERPL-CCNC: 73 U/L (ref 30–99)
ALT SERPL-CCNC: 21 U/L (ref 2–50)
ANION GAP SERPL CALC-SCNC: 13 MMOL/L (ref 7–16)
AST SERPL-CCNC: 19 U/L (ref 12–45)
BASOPHILS # BLD AUTO: 0.4 % (ref 0–1.8)
BASOPHILS # BLD: 0.03 K/UL (ref 0–0.12)
BILIRUB SERPL-MCNC: 0.5 MG/DL (ref 0.1–1.5)
BUN SERPL-MCNC: 11 MG/DL (ref 8–22)
CALCIUM ALBUM COR SERPL-MCNC: 9.4 MG/DL (ref 8.5–10.5)
CALCIUM SERPL-MCNC: 9.7 MG/DL (ref 8.5–10.5)
CHLORIDE SERPL-SCNC: 101 MMOL/L (ref 96–112)
CHOLEST SERPL-MCNC: 191 MG/DL (ref 100–199)
CO2 SERPL-SCNC: 24 MMOL/L (ref 20–33)
CREAT SERPL-MCNC: 0.82 MG/DL (ref 0.5–1.4)
EOSINOPHIL # BLD AUTO: 0.17 K/UL (ref 0–0.51)
EOSINOPHIL NFR BLD: 2.2 % (ref 0–6.9)
ERYTHROCYTE [DISTWIDTH] IN BLOOD BY AUTOMATED COUNT: 44.9 FL (ref 35.9–50)
FASTING STATUS PATIENT QL REPORTED: NORMAL
GFR SERPLBLD CREATININE-BSD FMLA CKD-EPI: 107 ML/MIN/1.73 M 2
GLOBULIN SER CALC-MCNC: 2.8 G/DL (ref 1.9–3.5)
GLUCOSE SERPL-MCNC: 94 MG/DL (ref 65–99)
HCT VFR BLD AUTO: 51.9 % (ref 42–52)
HDLC SERPL-MCNC: 38 MG/DL
HGB BLD-MCNC: 17.2 G/DL (ref 14–18)
IMM GRANULOCYTES # BLD AUTO: 0.02 K/UL (ref 0–0.11)
IMM GRANULOCYTES NFR BLD AUTO: 0.3 % (ref 0–0.9)
LDLC SERPL CALC-MCNC: 114 MG/DL
LYMPHOCYTES # BLD AUTO: 2.4 K/UL (ref 1–4.8)
LYMPHOCYTES NFR BLD: 30.5 % (ref 22–41)
MCH RBC QN AUTO: 31 PG (ref 27–33)
MCHC RBC AUTO-ENTMCNC: 33.1 G/DL (ref 32.3–36.5)
MCV RBC AUTO: 93.5 FL (ref 81.4–97.8)
MONOCYTES # BLD AUTO: 0.53 K/UL (ref 0–0.85)
MONOCYTES NFR BLD AUTO: 6.7 % (ref 0–13.4)
NEUTROPHILS # BLD AUTO: 4.73 K/UL (ref 1.82–7.42)
NEUTROPHILS NFR BLD: 59.9 % (ref 44–72)
NRBC # BLD AUTO: 0 K/UL
NRBC BLD-RTO: 0 /100 WBC (ref 0–0.2)
PLATELET # BLD AUTO: 346 K/UL (ref 164–446)
PMV BLD AUTO: 11 FL (ref 9–12.9)
POTASSIUM SERPL-SCNC: 3.8 MMOL/L (ref 3.6–5.5)
PROT SERPL-MCNC: 7.2 G/DL (ref 6–8.2)
PSA SERPL-MCNC: 0.73 NG/ML (ref 0–4)
PSA SERPL-MCNC: 0.73 NG/ML (ref 0–4)
RBC # BLD AUTO: 5.55 M/UL (ref 4.7–6.1)
SODIUM SERPL-SCNC: 138 MMOL/L (ref 135–145)
TRIGL SERPL-MCNC: 193 MG/DL (ref 0–149)
TSH SERPL DL<=0.005 MIU/L-ACNC: 1.96 UIU/ML (ref 0.38–5.33)
WBC # BLD AUTO: 7.9 K/UL (ref 4.8–10.8)

## 2024-09-30 PROCEDURE — 84270 ASSAY OF SEX HORMONE GLOBUL: CPT

## 2024-09-30 PROCEDURE — 85025 COMPLETE CBC W/AUTO DIFF WBC: CPT

## 2024-09-30 PROCEDURE — 80053 COMPREHEN METABOLIC PANEL: CPT

## 2024-09-30 PROCEDURE — 84153 ASSAY OF PSA TOTAL: CPT | Mod: 91

## 2024-09-30 PROCEDURE — 84443 ASSAY THYROID STIM HORMONE: CPT

## 2024-09-30 PROCEDURE — 84153 ASSAY OF PSA TOTAL: CPT

## 2024-09-30 PROCEDURE — 84403 ASSAY OF TOTAL TESTOSTERONE: CPT

## 2024-09-30 PROCEDURE — 80061 LIPID PANEL: CPT

## 2024-09-30 PROCEDURE — 84402 ASSAY OF FREE TESTOSTERONE: CPT

## 2024-09-30 PROCEDURE — 36415 COLL VENOUS BLD VENIPUNCTURE: CPT

## 2024-10-02 LAB
SHBG SERPL-SCNC: 19 NMOL/L (ref 17–56)
TESTOST FREE MFR SERPL: 2.3 % (ref 1.6–2.9)
TESTOST FREE SERPL-MCNC: 46 PG/ML (ref 47–244)
TESTOST SERPL-MCNC: 205 NG/DL (ref 300–890)

## 2024-10-16 DIAGNOSIS — R79.89 LOW TESTOSTERONE: ICD-10-CM

## 2024-10-18 RX ORDER — TESTOSTERONE CYPIONATE 200 MG/ML
INJECTION, SOLUTION INTRAMUSCULAR
Qty: 2 ML | Refills: 0 | Status: SHIPPED | OUTPATIENT
Start: 2024-10-24 | End: 2024-11-23

## 2024-12-09 ENCOUNTER — PATIENT MESSAGE (OUTPATIENT)
Dept: MEDICAL GROUP | Facility: LAB | Age: 49
End: 2024-12-09
Payer: COMMERCIAL

## 2024-12-09 DIAGNOSIS — R79.89 LOW TESTOSTERONE: ICD-10-CM

## 2024-12-09 RX ORDER — TESTOSTERONE CYPIONATE 200 MG/ML
50 INJECTION, SOLUTION INTRAMUSCULAR
Qty: 3 ML | Refills: 0 | Status: SHIPPED | OUTPATIENT
Start: 2024-12-09 | End: 2025-03-03

## 2025-01-13 DIAGNOSIS — F33.0 MILD EPISODE OF RECURRENT MAJOR DEPRESSIVE DISORDER (HCC): ICD-10-CM

## 2025-01-13 DIAGNOSIS — I10 ESSENTIAL HYPERTENSION: ICD-10-CM

## 2025-01-13 RX ORDER — LOSARTAN POTASSIUM 100 MG/1
100 TABLET ORAL
Qty: 90 TABLET | Refills: 3 | Status: SHIPPED | OUTPATIENT
Start: 2025-01-13

## 2025-01-13 RX ORDER — BUPROPION HYDROCHLORIDE 300 MG/1
300 TABLET ORAL EVERY MORNING
Qty: 90 TABLET | Refills: 3 | Status: SHIPPED | OUTPATIENT
Start: 2025-01-13

## 2025-01-13 RX ORDER — NAPROXEN 500 MG/1
500 TABLET ORAL 2 TIMES DAILY WITH MEALS
Qty: 180 TABLET | Refills: 0 | Status: SHIPPED | OUTPATIENT
Start: 2025-01-13

## 2025-01-13 RX ORDER — HYDROCHLOROTHIAZIDE 12.5 MG/1
12.5 TABLET ORAL DAILY
Qty: 90 TABLET | Refills: 3 | Status: SHIPPED | OUTPATIENT
Start: 2025-01-13

## 2025-01-13 RX ORDER — FLUOXETINE 40 MG/1
40 CAPSULE ORAL DAILY
Qty: 90 CAPSULE | Refills: 3 | Status: SHIPPED | OUTPATIENT
Start: 2025-01-13

## 2025-01-13 RX ORDER — PANTOPRAZOLE SODIUM 40 MG/1
40 TABLET, DELAYED RELEASE ORAL DAILY
Qty: 90 TABLET | Refills: 3 | Status: SHIPPED | OUTPATIENT
Start: 2025-01-13

## 2025-02-09 DIAGNOSIS — R79.89 LOW TESTOSTERONE: ICD-10-CM

## 2025-02-10 RX ORDER — TESTOSTERONE CYPIONATE 200 MG/ML
50 INJECTION, SOLUTION INTRAMUSCULAR
Qty: 3 ML | Refills: 0 | Status: SHIPPED | OUTPATIENT
Start: 2025-02-10 | End: 2025-05-05

## 2025-02-10 NOTE — TELEPHONE ENCOUNTER
HReceived request via: Patient    Was the patient seen in the last year in this department? Yes    Does the patient have an active prescription (recently filled or refills available) for medication(s) requested? No    [facility]    Does the patient have assisted Plus and need 100-day supply? (This applies to ALL medications) Patient does not have SCP

## 2025-02-12 ENCOUNTER — TELEMEDICINE (OUTPATIENT)
Dept: MEDICAL GROUP | Facility: LAB | Age: 50
End: 2025-02-12
Payer: COMMERCIAL

## 2025-02-12 VITALS — HEIGHT: 75 IN | BODY MASS INDEX: 29.48 KG/M2

## 2025-02-12 DIAGNOSIS — R39.9 UTI SYMPTOMS: ICD-10-CM

## 2025-02-12 DIAGNOSIS — R25.1 TREMOR OF BOTH HANDS: ICD-10-CM

## 2025-02-12 DIAGNOSIS — R68.89 EXERCISE INTOLERANCE: ICD-10-CM

## 2025-02-12 DIAGNOSIS — G47.9 SLEEP DISTURBANCES: ICD-10-CM

## 2025-02-12 DIAGNOSIS — G47.33 OBSTRUCTIVE SLEEP APNEA SYNDROME: ICD-10-CM

## 2025-02-12 DIAGNOSIS — R19.5 CHANGE IN CONSISTENCY OF STOOL: ICD-10-CM

## 2025-02-12 PROCEDURE — 99214 OFFICE O/P EST MOD 30 MIN: CPT | Mod: 95 | Performed by: PHYSICIAN ASSISTANT

## 2025-02-12 RX ORDER — ZOLPIDEM TARTRATE 3.5 MG/1
3.5 TABLET SUBLINGUAL
Qty: 30 TABLET | Refills: 3 | Status: SHIPPED | OUTPATIENT
Start: 2025-02-12 | End: 2025-03-14

## 2025-02-12 ASSESSMENT — PATIENT HEALTH QUESTIONNAIRE - PHQ9
2. FEELING DOWN, DEPRESSED, IRRITABLE, OR HOPELESS: MORE THAN HALF THE DAYS
7. TROUBLE CONCENTRATING ON THINGS, SUCH AS READING THE NEWSPAPER OR WATCHING TELEVISION: NOT AT ALL
8. MOVING OR SPEAKING SO SLOWLY THAT OTHER PEOPLE COULD HAVE NOTICED. OR THE OPPOSITE, BEING SO FIGETY OR RESTLESS THAT YOU HAVE BEEN MOVING AROUND A LOT MORE THAN USUAL: NOT AT ALL
5. POOR APPETITE OR OVEREATING: NEARLY EVERY DAY
9. THOUGHTS THAT YOU WOULD BE BETTER OFF DEAD, OR OF HURTING YOURSELF: NOT AT ALL
3. TROUBLE FALLING OR STAYING ASLEEP OR SLEEPING TOO MUCH: NEARLY EVERY DAY
1. LITTLE INTEREST OR PLEASURE IN DOING THINGS: NEARLY EVERY DAY
SUM OF ALL RESPONSES TO PHQ QUESTIONS 1-9: 14
SUM OF ALL RESPONSES TO PHQ9 QUESTIONS 1 AND 2: 5
6. FEELING BAD ABOUT YOURSELF - OR THAT YOU ARE A FAILURE OR HAVE LET YOURSELF OR YOUR FAMILY DOWN: NOT AL ALL
4. FEELING TIRED OR HAVING LITTLE ENERGY: NEARLY EVERY DAY

## 2025-02-12 NOTE — PROGRESS NOTES
Virtual Visit: Established Patient   This visit was conducted via Teams using secure and encrypted videoconferencing technology.   The patient was in their home in the [facility].    The patient's identity was confirmed and verbal consent was obtained for this virtual visit.    Subjective:   CC:   Chief Complaint   Patient presents with    Follow-Up     Willie Smith is a 50 y.o. male presenting for evaluation and management of:  Verbal consent was acquired by the patient to use [employer] ambient listening note generation during this visit Yes     History of Present Illness  The patient presents via virtual visit for evaluation of tremors, urinary issues, weight loss, and sleep disturbances.    #Tremor of Hands  He reports persistent tremors, which were particularly noticeable last Saturday when he experienced an episode of lightheadedness, incoherence, and perspiration after a non-strenuous activity such as loading groceries.  These tremors are more noticeable during rest and can be controlled with focusing on them.  The become more pronounced with activity.    # Hypertension   His blood pressure was elevated at 172/81 with a heart rate of 93. After resting for approximately 1 hour and 5 minutes, his blood pressure decreased to 138/79, but his heart rate remained at 90. The following morning, his blood pressure was 129/80 with a heart rate of 76 upon waking. He also reported feeling shaky and nervous during a recent workday, which improved after a period of rest. He describes a sensation of internal shakiness and nervousness during this consultation. He is currently on losartan and hydrochlorothiazide for blood pressure management and has not missed any doses recently, although he admits to occasional non-compliance.    #Urinary Changes  He has observed changes in his urine, including cloudiness over the past 1 to 2 weeks. He reports no pain during urination or unusual odor.  He notes he is  having to strain more to urinate.  He does have a history of BPH and is established with urology.  He does note some stool leakage with the straining he has not undergone a colonoscopy, but his urologist has recommended one.     # GI symptoms, weight loss  He experienced a significant illness following a vacation to [place] a year ago, resulting in a weight loss of 20 pounds in December 2023. His girlfriend has observed a decrease in his appetite since then, and he reports eating less than usual. She suspects a parasitic infection as the cause of his symptoms. He has been experiencing intermittent gastrointestinal discomfort over the past month.     # Insomnia, stress  He has been prescribed zolpidem tartrate 3.5 mg for sleep disturbances but has been unable to obtain the medication due to insurance denials. He reports frequent awakenings at night, approximately every 1.5 to 2 hours, and sometimes struggles to return to sleep.    MEDICATIONS  Current: Losartan, hydrochlorothiazide, zolpidem tartrate.        ROS   All ROS negative except for pertinent positives listed above.       Current medicines (including changes today)  Current Outpatient Medications   Medication Sig Dispense Refill    testosterone cypionate (DEPO-TESTOSTERONE) 200 MG/ML injection Inject 0.25 mL into the shoulder, thigh, or buttocks every 7 days for 84 days. Indications: Deficient Activity of the Testis 3 mL 0    losartan (COZAAR) 100 MG Tab Take 1 Tablet by mouth every day. 90 Tablet 3    fluoxetine (PROZAC) 40 MG capsule Take 1 Capsule by mouth every day. 90 Capsule 3    naproxen (NAPROSYN) 500 MG Tab Take 1 Tablet by mouth 2 times a day with meals. 180 Tablet 0    pantoprazole (PROTONIX) 40 MG Tablet Delayed Response Take 1 Tablet by mouth every day. 90 Tablet 3    buPROPion (WELLBUTRIN XL) 300 MG XL tablet Take 1 Tablet by mouth every morning. 90 Tablet 3    hydroCHLOROthiazide 12.5 MG tablet Take 1 Tablet by mouth every day. 90 Tablet 3     "NEEDLE, DISP, 25 G (BD ECLIPSE) 25G X 1-1/2\" Misc Use as directed for testosterone injections 50 Each 0    syringe 1 ML Misc For use as directed for testosterone injections 50 Each 0    topiramate (TOPAMAX) 100 MG Tab Take 1 Tablet by mouth at bedtime. 90 Tablet 3    tamsulosin (FLOMAX) 0.4 MG capsule Take 1 Capsule by mouth 1/2 hour after breakfast. 90 Capsule 3    azelastine (ASTELIN) 137 MCG/SPRAY nasal spray Administer 1 Spray into affected nostril(S) every day. USE 1 SPRAY PER NOSTRIL ONCE PER DAY 30 mL 3    albuterol 108 (90 Base) MCG/ACT Aero Soln inhalation aerosol Inhale 2 Puffs every four hours as needed for Shortness of Breath. 1 Each 0    methylPREDNISolone (MEDROL DOSEPAK) 4 MG Tablet Therapy Pack As directed on the packaging label. (Patient not taking: Reported on 2/12/2025) 21 Tablet 0     No current facility-administered medications for this visit.       Patient Active Problem List    Diagnosis Date Noted    Carpal tunnel syndrome on left 08/01/2022    Chronic bilateral low back pain with bilateral sciatica 03/19/2022    Benign prostatic hyperplasia 03/18/2022    Low testosterone 06/10/2021    Laceration of right foot 03/05/2020    Anxiety disorder 01/13/2020    Tinnitus 12/05/2019    Hypertension 11/25/2019    ADD (attention deficit disorder) without hyperactivity 03/06/2018    Moderate episode of recurrent major depressive disorder (HCC) 03/06/2018    Obstructive sleep apnea syndrome 01/29/2018    Insomnia 01/12/2018    Vitamin D deficiency 11/08/2016    Gastroesophageal reflux disease without esophagitis 10/04/2016    Depression 10/04/2016    Disorder of uvula of palate 10/04/2016    Obese 10/04/2016        Objective:   Ht 1.905 m (6' 3\")   BMI 29.48 kg/m²     Physical Exam:  Constitutional: Alert, no distress, well-groomed.  Skin: No rashes in visible areas.  Eye: Round. Conjunctiva clear, lids normal. No icterus.   ENMT: Lips pink without lesions, good dentition, moist mucous membranes. " Phonation normal.  Neck: No masses, no thyromegaly. Moves freely without pain.  Respiratory: Unlabored respiratory effort, no cough or audible wheeze  Psych: Alert and oriented x3, normal affect and mood.     Assessment and Plan:   The following treatment plan was discussed:   Assessment & Plan  1. Tremor.  The patient continues to experience tremors, which have been associated with episodes of shakiness, lightheadedness, and sweating. Blood pressure readings during these episodes were elevated. A referral to neurology has been initiated for further evaluation of the tremor.    2. Urinary changes.  The patient reports cloudy urine and the need to bear down to urinate, which has led to stool leakage. This could be due to an enlarged prostate or a urinary tract infection. A urinalysis has been ordered to rule out a urinary tract infection. The patient has been advised to follow up with urology for further evaluation.    3. Weight loss and decreased appetite.  The patient has experienced significant weight loss and decreased appetite since returning from a trip to [place] a year ago. Persistent diarrhea or abnormalities in stool could indicate a parasitic infection. Lab results may show white cell count abnormalities or elevated eosinophils. Decreased appetite could also be due to bowel changes. Stool studies have been ordered to investigate potential causes. The patient has been advised to undergo a colonoscopy for further evaluation.    4. Sleep disturbances.  The patient has been experiencing sleep disturbances, including frequent nighttime urination and difficulty returning to sleep. A prescription for zolpidem tartrate 5 mg at bedtime as needed has been provided. The patient has been advised to pay out-of-pocket for the medication if insurance does not cover it. A referral to sleep medicine has been initiated for further evaluation.    5. Elevated blood pressure.  The patient experienced elevated blood pressure  during a recent episode of shakiness and lightheadedness. Current medications include losartan and hydrochlorothiazide. The patient has been advised to continue current medications and monitor blood pressure regularly.    6. Exercise intolerance.  A cardiac stress test has been ordered to evaluate exercise intolerance and associated symptoms of shakiness and nervousness.    1. Tremor of both hands  Referral to Neurology      2. UTI symptoms  URINALYSIS,CULTURE IF INDICATED      3. Obstructive sleep apnea syndrome  Referral to Pulmonary and Sleep Medicine      4. Change in consistency of stool  H.PYLORI STOOL ANTIGEN    CULTURE STOOL    Complete O&P      5. Sleep disturbances  Zolpidem Tartrate 3.5 MG SL Tab      6. Exercise intolerance  NM-CARDIAC TREADMILL ONLY-NO IMAGING            Follow-up: No follow-ups on file.

## 2025-02-14 NOTE — Clinical Note
REFERRAL APPROVAL NOTICE         Sent on February 14, 2025                   Skinny Smith  [address]  [place] [address]                   Dear Mr. Smith,    After a careful review of the medical information and benefit coverage, [facility] has processed your referral. See below for additional details.    If applicable, you must be actively enrolled with your insurance for coverage of the authorized service. If you have any questions regarding your coverage, please contact your insurance directly.    REFERRAL INFORMATION   Referral #:  [id number]  Referred-To Department    Referred-By Provider:  Neurology    Litzy Gastelum P.A.-C.   Neurology [facility]      [address]  [place] [address]  [phone] [address], [address]  [place] [id number] [phone]    Referral Start Date:  02/12/2025  Referral End Date:   02/12/2026           SCHEDULING  If you do not already have an appointment, please call [phone] to make an appointment.   MORE INFORMATION  As a reminder, [facility] ownership has changed, meaning this location is now owned and operated by [facility]. As such, we want to clarify that our patients should expect to receive two separate bills for the services received at [facility] - one representing the [facility] facility fees as the owner of the establishment, and the other to represent the physician's services and subsequent fees. You can speak with your insurance carrier for a pricing estimate by calling the customer service number on the back of your card and ask about charges for a hospital outpatient visit.  If you do not already have a [employer] account, sign up at: [email or web address]  You can access your medical information, make appointments, see lab results, billing information, and more.  If you have questions regarding this referral, please contact  the [facility] Referrals department at:              [phone]. Monday - Friday 7:30AM - 5:00PM.      Sincerely,  [facility]

## 2025-02-14 NOTE — Clinical Note
REFERRAL APPROVAL NOTICE         Sent on February 14, 2025                   Skinny Smith  [address]  [place] [address]                   Dear Mr. Smith,    After a careful review of the medical information and benefit coverage, [facility] has processed your referral. See below for additional details.    If applicable, you must be actively enrolled with your insurance for coverage of the authorized service. If you have any questions regarding your coverage, please contact your insurance directly.    REFERRAL INFORMATION   Referral #:  [id number]  Referred-To Department    Referred-By Provider:  Pulmonary and Sleep Medicine    Litzy Gastelum P.A.-C.   Pulmonary/sleep [facility]      [address]  [place] [id number] [phone] [address]  [place] [address] [phone]    Referral Start Date:  02/12/2025  Referral End Date:   02/12/2026           SCHEDULING  If you do not already have an appointment, please call [phone] to make an appointment.   MORE INFORMATION  As a reminder, [facility] ownership has changed, meaning this location is now owned and operated by [facility]. As such, we want to clarify that our patients should expect to receive two separate bills for the services received at [facility] - one representing the [facility] facility fees as the owner of the establishment, and the other to represent the physician's services and subsequent fees. You can speak with your insurance carrier for a pricing estimate by calling the customer service number on the back of your card and ask about charges for a hospital outpatient visit.  If you do not already have a [employer] account, sign up at: [email or web address]  You can access your medical information, make appointments, see lab  results, billing information, and more.  If you have questions regarding this referral, please contact  the [facility] department at:             [phone]. Monday - Friday 7:30AM - 5:00PM.      Sincerely,  [facility]

## 2025-03-05 ENCOUNTER — APPOINTMENT (OUTPATIENT)
Dept: RADIOLOGY | Facility: MEDICAL CENTER | Age: 50
End: 2025-03-05
Attending: PHYSICIAN ASSISTANT
Payer: COMMERCIAL

## 2025-03-12 ENCOUNTER — HOSPITAL ENCOUNTER (OUTPATIENT)
Dept: RADIOLOGY | Facility: MEDICAL CENTER | Age: 50
End: 2025-03-12
Attending: PHYSICIAN ASSISTANT
Payer: COMMERCIAL

## 2025-03-12 DIAGNOSIS — R68.89 EXERCISE INTOLERANCE: ICD-10-CM

## 2025-03-12 PROCEDURE — 93017 CV STRESS TEST TRACING ONLY: CPT | Mod: TC

## 2025-03-12 PROCEDURE — 93018 CV STRESS TEST I&R ONLY: CPT | Performed by: INTERNAL MEDICINE

## 2025-03-14 ENCOUNTER — HOSPITAL ENCOUNTER (OUTPATIENT)
Facility: MEDICAL CENTER | Age: 50
End: 2025-03-14
Attending: UROLOGY
Payer: COMMERCIAL

## 2025-03-14 ENCOUNTER — RESULTS FOLLOW-UP (OUTPATIENT)
Dept: MEDICAL GROUP | Facility: LAB | Age: 50
End: 2025-03-14
Payer: COMMERCIAL

## 2025-03-14 PROCEDURE — 87086 URINE CULTURE/COLONY COUNT: CPT

## 2025-03-17 LAB
BACTERIA UR CULT: NORMAL
SIGNIFICANT IND 70042: NORMAL
SITE SITE: NORMAL
SOURCE SOURCE: NORMAL

## 2025-05-23 DIAGNOSIS — J30.2 SEASONAL ALLERGIES: ICD-10-CM

## 2025-05-27 RX ORDER — AZELASTINE HYDROCHLORIDE 137 UG/1
SPRAY, METERED NASAL
Qty: 30 ML | Refills: 3 | Status: SHIPPED | OUTPATIENT
Start: 2025-05-27

## 2025-05-27 NOTE — TELEPHONE ENCOUNTER
Received request via: Pharmacy    Was the patient seen in the last year in this department? Yes    Does the patient have an active prescription (recently filled or refills available) for medication(s) requested? No    [facility]    Does the patient have skilled nursing Plus and need 100-day supply? (This applies to ALL medications) Patient does not have SCP

## 2025-07-10 ENCOUNTER — PATIENT MESSAGE (OUTPATIENT)
Dept: MEDICAL GROUP | Facility: LAB | Age: 50
End: 2025-07-10
Payer: COMMERCIAL

## 2025-07-10 DIAGNOSIS — R79.89 LOW TESTOSTERONE: Primary | ICD-10-CM

## 2025-07-11 RX ORDER — TESTOSTERONE CYPIONATE 200 MG/ML
INJECTION, SOLUTION INTRAMUSCULAR
Qty: 3 ML | Refills: 0 | Status: SHIPPED | OUTPATIENT
Start: 2025-07-11 | End: 2025-10-09

## (undated) DEVICE — TOURNIQUET CUFF 24 X 4 ONE PORT - STERILE (10/BX)

## (undated) DEVICE — BANDAGE ELASTIC LATEX STERILE VELCRO 4 X 5 YDS (25EA/CA)

## (undated) DEVICE — KNIFE MINI CARPAL TUNNEL  DISPOSABLE (5EA/BX)

## (undated) DEVICE — SUCTION INSTRUMENT YANKAUER BULBOUS TIP W/O VENT (50EA/CA)

## (undated) DEVICE — SLEEVE VASO CALF MED - (10PR/CA)

## (undated) DEVICE — CANISTER SUCTION 3000ML MECHANICAL FILTER AUTO SHUTOFF MEDI-VAC NONSTERILE LF DISP  (40EA/CA)

## (undated) DEVICE — CANISTER SUCTION RIGID RED 1500CC (40EA/CA)

## (undated) DEVICE — CANNULA W/ SUPPLY TUBING O2 - (50/CA)

## (undated) DEVICE — PACK UPPER EXTREMITY (2EA/CA)

## (undated) DEVICE — SODIUM CHL IRRIGATION 0.9% 1000ML (12EA/CA)

## (undated) DEVICE — KIT  I.V. START (100EA/CA)

## (undated) DEVICE — LACTATED RINGERS INJ 1000 ML - (14EA/CA 60CA/PF)

## (undated) DEVICE — GOWN WARMING STANDARD FLEX - (30/CA)

## (undated) DEVICE — TUBE CONNECTING SUCTION - CLEAR PLASTIC STERILE 72 IN (50EA/CA)

## (undated) DEVICE — GLOVE BIOGEL SZ 8 SURGICAL PF LTX - (50PR/BX 4BX/CA)

## (undated) DEVICE — SUTURE GENERAL

## (undated) DEVICE — SENSOR OXIMETER ADULT SPO2 RD SET (20EA/BX)

## (undated) DEVICE — WATER IRRIGATION STERILE 1000ML (12EA/CA)

## (undated) DEVICE — TOWEL STOP TIMEOUT SAFETY FLAG (40EA/CA)

## (undated) DEVICE — SET LEADWIRE 5 LEAD BEDSIDE DISPOSABLE ECG (1SET OF 5/EA)

## (undated) DEVICE — TUBING CLEARLINK DUO-VENT - C-FLO (48EA/CA)

## (undated) DEVICE — SUTURE 4-0 ETHILON FS-2 18 (36PK/BX)"

## (undated) DEVICE — MASK OXYGEN VNYL ADLT MED CONC WITH 7 FOOT TUBING  - (50EA/CA)